# Patient Record
Sex: FEMALE | Race: WHITE | Employment: OTHER | URBAN - METROPOLITAN AREA
[De-identification: names, ages, dates, MRNs, and addresses within clinical notes are randomized per-mention and may not be internally consistent; named-entity substitution may affect disease eponyms.]

---

## 2019-03-08 LAB
ALBUMIN SERPL-MCNC: NORMAL G/DL
ALP BLD-CCNC: NORMAL U/L
ALT SERPL-CCNC: NORMAL U/L
ANION GAP SERPL CALCULATED.3IONS-SCNC: NORMAL MMOL/L
AST SERPL-CCNC: NORMAL U/L
AVERAGE GLUCOSE: NORMAL
BILIRUB SERPL-MCNC: NORMAL MG/DL
BUN BLDV-MCNC: NORMAL MG/DL
CALCIUM SERPL-MCNC: NORMAL MG/DL
CHLORIDE BLD-SCNC: NORMAL MMOL/L
CHOLESTEROL, TOTAL: NORMAL
CHOLESTEROL/HDL RATIO: NORMAL
CO2: NORMAL
CREAT SERPL-MCNC: NORMAL MG/DL
GFR CALCULATED: NORMAL
GLUCOSE BLD-MCNC: NORMAL MG/DL
HBA1C MFR BLD: 5.6 %
HDLC SERPL-MCNC: NORMAL MG/DL
LDL CHOLESTEROL CALCULATED: NORMAL
POTASSIUM SERPL-SCNC: NORMAL MMOL/L
SODIUM BLD-SCNC: NORMAL MMOL/L
TOTAL PROTEIN: NORMAL
TRIGL SERPL-MCNC: NORMAL MG/DL
VLDLC SERPL CALC-MCNC: NORMAL MG/DL

## 2019-12-23 RX ORDER — CHOLECALCIFEROL (VITAMIN D3) 1250 MCG
CAPSULE ORAL WEEKLY
COMMUNITY
End: 2022-06-28

## 2019-12-23 RX ORDER — ALBUTEROL SULFATE 90 UG/1
2 AEROSOL, METERED RESPIRATORY (INHALATION) 4 TIMES DAILY PRN
COMMUNITY
End: 2020-04-16

## 2019-12-23 RX ORDER — SIMVASTATIN 10 MG
10 TABLET ORAL DAILY
COMMUNITY
End: 2020-02-18 | Stop reason: SDUPTHER

## 2020-02-18 NOTE — TELEPHONE ENCOUNTER
Name of Medication(s) Requested:  simvastatin 10 mg qd    Pharmacy Requested:   [facility]    Medication(s) pended? [x] Yes  [] No    Last Appointment:  Visit date not found    Future appts:  Future Appointments   Date Time Provider Barb Kong   3/13/2020  2:00 PM DO [facility]        Does patient need call back?   [] Yes  [x] No

## 2020-02-19 RX ORDER — SIMVASTATIN 10 MG
10 TABLET ORAL DAILY
Qty: 90 TABLET | Refills: 3 | Status: SHIPPED
Start: 2020-02-19 | End: 2020-04-28 | Stop reason: SDUPTHER

## 2020-03-02 ENCOUNTER — TELEPHONE (OUTPATIENT)
Dept: ADMINISTRATIVE | Age: 65
End: 2020-03-02

## 2020-04-16 ENCOUNTER — HOSPITAL ENCOUNTER (OUTPATIENT)
Age: 65
Setting detail: OBSERVATION
Discharge: HOME | End: 2020-04-17
Attending: EMERGENCY MEDICINE | Admitting: INTERNAL MEDICINE
Payer: MEDICARE

## 2020-04-16 ENCOUNTER — APPOINTMENT (OUTPATIENT)
Dept: GENERAL RADIOLOGY | Age: 65
End: 2020-04-16
Payer: MEDICARE

## 2020-04-16 ENCOUNTER — APPOINTMENT (OUTPATIENT)
Dept: CT IMAGING | Age: 65
End: 2020-04-16
Payer: MEDICARE

## 2020-04-16 PROBLEM — R55 SYNCOPE AND COLLAPSE: Status: ACTIVE | Noted: 2020-04-16

## 2020-04-16 LAB
ALBUMIN SERPL-MCNC: 4.7 G/DL (ref 3.5–5.2)
ALP BLD-CCNC: 75 U/L (ref 35–104)
ALT SERPL-CCNC: 26 U/L (ref 0–32)
ANION GAP SERPL CALCULATED.3IONS-SCNC: 11 MMOL/L (ref 7–16)
AST SERPL-CCNC: 26 U/L (ref 0–31)
BASOPHILS ABSOLUTE: 0.02 E9/L (ref 0–0.2)
BASOPHILS RELATIVE PERCENT: 0.3 % (ref 0–2)
BILIRUB SERPL-MCNC: 0.3 MG/DL (ref 0–1.2)
BILIRUBIN URINE: NEGATIVE
BLOOD, URINE: NEGATIVE
BUN BLDV-MCNC: 16 MG/DL (ref 8–23)
CALCIUM SERPL-MCNC: 9.3 MG/DL (ref 8.6–10.2)
CHLORIDE BLD-SCNC: 101 MMOL/L (ref 98–107)
CLARITY: CLEAR
CO2: 26 MMOL/L (ref 22–29)
COLOR: NORMAL
CREAT SERPL-MCNC: 0.9 MG/DL (ref 0.5–1)
EOSINOPHILS ABSOLUTE: 0.03 E9/L (ref 0.05–0.5)
EOSINOPHILS RELATIVE PERCENT: 0.4 % (ref 0–6)
GFR AFRICAN AMERICAN: >60
GFR NON-AFRICAN AMERICAN: >60 ML/MIN/1.73
GLUCOSE BLD-MCNC: 111 MG/DL (ref 74–99)
GLUCOSE URINE: NEGATIVE MG/DL
HCT VFR BLD CALC: 40.1 % (ref 34–48)
HEMOGLOBIN: 13 G/DL (ref 11.5–15.5)
IMMATURE GRANULOCYTES #: 0.03 E9/L
IMMATURE GRANULOCYTES %: 0.4 % (ref 0–5)
KETONES, URINE: NEGATIVE MG/DL
LEUKOCYTE ESTERASE, URINE: NEGATIVE
LYMPHOCYTES ABSOLUTE: 1.25 E9/L (ref 1.5–4)
LYMPHOCYTES RELATIVE PERCENT: 16.6 % (ref 20–42)
MAGNESIUM: 2 MG/DL (ref 1.6–2.6)
MCH RBC QN AUTO: 28.8 PG (ref 26–35)
MCHC RBC AUTO-ENTMCNC: 32.4 % (ref 32–34.5)
MCV RBC AUTO: 88.9 FL (ref 80–99.9)
METER GLUCOSE: 104 MG/DL (ref 74–99)
MONOCYTES ABSOLUTE: 0.52 E9/L (ref 0.1–0.95)
MONOCYTES RELATIVE PERCENT: 6.9 % (ref 2–12)
NEUTROPHILS ABSOLUTE: 5.68 E9/L (ref 1.8–7.3)
NEUTROPHILS RELATIVE PERCENT: 75.4 % (ref 43–80)
NITRITE, URINE: NEGATIVE
PDW BLD-RTO: 12.9 FL (ref 11.5–15)
PH UA: 5.5 (ref 5–9)
PLATELET # BLD: 264 E9/L (ref 130–450)
PMV BLD AUTO: 10.3 FL (ref 7–12)
POTASSIUM SERPL-SCNC: 4.3 MMOL/L (ref 3.5–5)
PRO-BNP: 102 PG/ML (ref 0–125)
PROTEIN UA: NEGATIVE MG/DL
RBC # BLD: 4.51 E12/L (ref 3.5–5.5)
SODIUM BLD-SCNC: 138 MMOL/L (ref 132–146)
SPECIFIC GRAVITY UA: <=1.005 (ref 1–1.03)
TOTAL PROTEIN: 7.3 G/DL (ref 6.4–8.3)
TROPONIN: <0.01 NG/ML (ref 0–0.03)
UROBILINOGEN, URINE: 0.2 E.U./DL
WBC # BLD: 7.5 E9/L (ref 4.5–11.5)

## 2020-04-16 PROCEDURE — G0378 HOSPITAL OBSERVATION PER HR: HCPCS

## 2020-04-16 PROCEDURE — 99220 PR INITIAL OBSERVATION CARE/DAY 70 MINUTES: CPT | Performed by: INTERNAL MEDICINE

## 2020-04-16 PROCEDURE — 83880 ASSAY OF NATRIURETIC PEPTIDE: CPT

## 2020-04-16 PROCEDURE — 82962 GLUCOSE BLOOD TEST: CPT

## 2020-04-16 PROCEDURE — 99285 EMERGENCY DEPT VISIT HI MDM: CPT

## 2020-04-16 PROCEDURE — 83735 ASSAY OF MAGNESIUM: CPT

## 2020-04-16 PROCEDURE — 6360000002 HC RX W HCPCS: Performed by: INTERNAL MEDICINE

## 2020-04-16 PROCEDURE — 71045 X-RAY EXAM CHEST 1 VIEW: CPT

## 2020-04-16 PROCEDURE — 85025 COMPLETE CBC W/AUTO DIFF WBC: CPT

## 2020-04-16 PROCEDURE — 84484 ASSAY OF TROPONIN QUANT: CPT

## 2020-04-16 PROCEDURE — 96374 THER/PROPH/DIAG INJ IV PUSH: CPT

## 2020-04-16 PROCEDURE — 2580000003 HC RX 258: Performed by: INTERNAL MEDICINE

## 2020-04-16 PROCEDURE — 94760 N-INVAS EAR/PLS OXIMETRY 1: CPT

## 2020-04-16 PROCEDURE — 70450 CT HEAD/BRAIN W/O DYE: CPT

## 2020-04-16 PROCEDURE — 93005 ELECTROCARDIOGRAM TRACING: CPT | Performed by: EMERGENCY MEDICINE

## 2020-04-16 PROCEDURE — 81003 URINALYSIS AUTO W/O SCOPE: CPT

## 2020-04-16 PROCEDURE — 80053 COMPREHEN METABOLIC PANEL: CPT

## 2020-04-16 RX ORDER — ATORVASTATIN CALCIUM 10 MG/1
10 TABLET, FILM COATED ORAL DAILY
Status: DISCONTINUED | OUTPATIENT
Start: 2020-04-16 | End: 2020-04-16

## 2020-04-16 RX ORDER — SODIUM CHLORIDE 0.9 % (FLUSH) 0.9 %
10 SYRINGE (ML) INJECTION EVERY 12 HOURS SCHEDULED
Status: DISCONTINUED | OUTPATIENT
Start: 2020-04-16 | End: 2020-04-17 | Stop reason: HOSPADM

## 2020-04-16 RX ORDER — ONDANSETRON 2 MG/ML
4 INJECTION INTRAMUSCULAR; INTRAVENOUS EVERY 6 HOURS PRN
Status: DISCONTINUED | OUTPATIENT
Start: 2020-04-16 | End: 2020-04-17 | Stop reason: HOSPADM

## 2020-04-16 RX ORDER — ATORVASTATIN CALCIUM 10 MG/1
10 TABLET, FILM COATED ORAL NIGHTLY
Status: DISCONTINUED | OUTPATIENT
Start: 2020-04-16 | End: 2020-04-17 | Stop reason: HOSPADM

## 2020-04-16 RX ORDER — SODIUM CHLORIDE 0.9 % (FLUSH) 0.9 %
10 SYRINGE (ML) INJECTION PRN
Status: DISCONTINUED | OUTPATIENT
Start: 2020-04-16 | End: 2020-04-17 | Stop reason: HOSPADM

## 2020-04-16 RX ORDER — PROMETHAZINE HYDROCHLORIDE 25 MG/1
12.5 TABLET ORAL EVERY 6 HOURS PRN
Status: DISCONTINUED | OUTPATIENT
Start: 2020-04-16 | End: 2020-04-17 | Stop reason: HOSPADM

## 2020-04-16 RX ORDER — SODIUM CHLORIDE 9 MG/ML
INJECTION, SOLUTION INTRAVENOUS CONTINUOUS
Status: ACTIVE | OUTPATIENT
Start: 2020-04-16 | End: 2020-04-17

## 2020-04-16 RX ORDER — ACETAMINOPHEN 325 MG/1
650 TABLET ORAL EVERY 6 HOURS PRN
Status: DISCONTINUED | OUTPATIENT
Start: 2020-04-16 | End: 2020-04-17 | Stop reason: HOSPADM

## 2020-04-16 RX ORDER — ATORVASTATIN CALCIUM 10 MG/1
10 TABLET, FILM COATED ORAL DAILY
Status: DISCONTINUED | OUTPATIENT
Start: 2020-04-17 | End: 2020-04-16

## 2020-04-16 RX ORDER — POLYETHYLENE GLYCOL 3350 17 G/17G
17 POWDER, FOR SOLUTION ORAL DAILY PRN
Status: DISCONTINUED | OUTPATIENT
Start: 2020-04-16 | End: 2020-04-17 | Stop reason: HOSPADM

## 2020-04-16 RX ORDER — ACETAMINOPHEN 650 MG/1
650 SUPPOSITORY RECTAL EVERY 6 HOURS PRN
Status: DISCONTINUED | OUTPATIENT
Start: 2020-04-16 | End: 2020-04-17 | Stop reason: HOSPADM

## 2020-04-16 RX ORDER — HYDRALAZINE HYDROCHLORIDE 20 MG/ML
10 INJECTION INTRAMUSCULAR; INTRAVENOUS EVERY 6 HOURS PRN
Status: DISCONTINUED | OUTPATIENT
Start: 2020-04-16 | End: 2020-04-17 | Stop reason: HOSPADM

## 2020-04-16 RX ADMIN — SODIUM CHLORIDE: 9 INJECTION, SOLUTION INTRAVENOUS at 21:34

## 2020-04-16 RX ADMIN — SODIUM CHLORIDE, PRESERVATIVE FREE 10 ML: 5 INJECTION INTRAVENOUS at 21:35

## 2020-04-16 RX ADMIN — HYDRALAZINE HYDROCHLORIDE 10 MG: 20 INJECTION INTRAMUSCULAR; INTRAVENOUS at 21:34

## 2020-04-16 ASSESSMENT — ENCOUNTER SYMPTOMS
NAUSEA: 1
COUGH: 0
WHEEZING: 0
EYE REDNESS: 0
SORE THROAT: 0
SINUS PRESSURE: 0
RHINORRHEA: 0
EYE PAIN: 0
ABDOMINAL PAIN: 0
DIARRHEA: 0
BLURRED VISION: 0
SHORTNESS OF BREATH: 0
VOMITING: 0
COLOR CHANGE: 0

## 2020-04-16 ASSESSMENT — PAIN DESCRIPTION - DESCRIPTORS
DESCRIPTORS: HEADACHE
DESCRIPTORS: CONSTANT;THROBBING

## 2020-04-16 ASSESSMENT — PAIN DESCRIPTION - ORIENTATION: ORIENTATION: RIGHT;LEFT

## 2020-04-16 ASSESSMENT — PAIN DESCRIPTION - ONSET
ONSET: ON-GOING
ONSET: SUDDEN

## 2020-04-16 ASSESSMENT — PAIN DESCRIPTION - LOCATION
LOCATION: HEAD
LOCATION: HEAD

## 2020-04-16 ASSESSMENT — PAIN - FUNCTIONAL ASSESSMENT
PAIN_FUNCTIONAL_ASSESSMENT: ACTIVITIES ARE NOT PREVENTED
PAIN_FUNCTIONAL_ASSESSMENT: PREVENTS OR INTERFERES SOME ACTIVE ACTIVITIES AND ADLS

## 2020-04-16 ASSESSMENT — PAIN DESCRIPTION - PAIN TYPE
TYPE: ACUTE PAIN
TYPE: ACUTE PAIN

## 2020-04-16 ASSESSMENT — PAIN DESCRIPTION - PROGRESSION: CLINICAL_PROGRESSION: NOT CHANGED

## 2020-04-16 ASSESSMENT — PAIN DESCRIPTION - FREQUENCY
FREQUENCY: CONTINUOUS
FREQUENCY: CONTINUOUS

## 2020-04-16 ASSESSMENT — PAIN SCALES - GENERAL
PAINLEVEL_OUTOF10: 6
PAINLEVEL_OUTOF10: 7

## 2020-04-16 NOTE — ED PROVIDER NOTES
MCV 88.9 80.0 - 99.9 fL    MCH 28.8 26.0 - 35.0 pg    MCHC 32.4 32.0 - 34.5 %    RDW 12.9 11.5 - 15.0 fL    Platelets 261 220 - 925 E9/L    MPV 10.3 7.0 - 12.0 fL    Neutrophils % 75.4 43.0 - 80.0 %    Immature Granulocytes % 0.4 0.0 - 5.0 %    Lymphocytes % 16.6 (L) 20.0 - 42.0 %    Monocytes % 6.9 2.0 - 12.0 %    Eosinophils % 0.4 0.0 - 6.0 %    Basophils % 0.3 0.0 - 2.0 %    Neutrophils Absolute 5.68 1.80 - 7.30 E9/L    Immature Granulocytes # 0.03 E9/L    Lymphocytes Absolute 1.25 (L) 1.50 - 4.00 E9/L    Monocytes Absolute 0.52 0.10 - 0.95 E9/L    Eosinophils Absolute 0.03 (L) 0.05 - 0.50 E9/L    Basophils Absolute 0.02 0.00 - 0.20 E9/L   Comprehensive Metabolic Panel   Result Value Ref Range    Sodium 138 132 - 146 mmol/L    Potassium 4.3 3.5 - 5.0 mmol/L    Chloride 101 98 - 107 mmol/L    CO2 26 22 - 29 mmol/L    Anion Gap 11 7 - 16 mmol/L    Glucose 111 (H) 74 - 99 mg/dL    BUN 16 8 - 23 mg/dL    CREATININE 0.9 0.5 - 1.0 mg/dL    GFR Non-African American >60 >=60 mL/min/1.73    GFR African American >60     Calcium 9.3 8.6 - 10.2 mg/dL    Total Protein 7.3 6.4 - 8.3 g/dL    Alb 4.7 3.5 - 5.2 g/dL    Total Bilirubin 0.3 0.0 - 1.2 mg/dL    Alkaline Phosphatase 75 35 - 104 U/L    ALT 26 0 - 32 U/L    AST 26 0 - 31 U/L   Magnesium   Result Value Ref Range    Magnesium 2.0 1.6 - 2.6 mg/dL   Troponin   Result Value Ref Range    Troponin <0.01 0.00 - 0.03 ng/mL   Brain Natriuretic Peptide   Result Value Ref Range    Pro- 0 - 125 pg/mL   Urinalysis, reflex to microscopic   Result Value Ref Range    Color, UA Straw Straw/Yellow    Clarity, UA Clear Clear    Glucose, Ur Negative Negative mg/dL    Bilirubin Urine Negative Negative    Ketones, Urine Negative Negative mg/dL    Specific Gravity, UA <=1.005 1.005 - 1.030    Blood, Urine Negative Negative    pH, UA 5.5 5.0 - 9.0    Protein, UA Negative Negative mg/dL    Urobilinogen, Urine 0.2 <2.0 E.U./dL    Nitrite, Urine Negative Negative    Leukocyte Esterase,

## 2020-04-17 ENCOUNTER — APPOINTMENT (OUTPATIENT)
Dept: ULTRASOUND IMAGING | Age: 65
End: 2020-04-17
Payer: MEDICARE

## 2020-04-17 VITALS
TEMPERATURE: 97.7 F | HEIGHT: 63 IN | BODY MASS INDEX: 35.1 KG/M2 | RESPIRATION RATE: 18 BRPM | SYSTOLIC BLOOD PRESSURE: 140 MMHG | DIASTOLIC BLOOD PRESSURE: 76 MMHG | OXYGEN SATURATION: 97 % | WEIGHT: 198.1 LBS | HEART RATE: 69 BPM

## 2020-04-17 PROBLEM — E78.49 OTHER HYPERLIPIDEMIA: Status: ACTIVE | Noted: 2020-04-17

## 2020-04-17 PROBLEM — I16.0 HYPERTENSIVE URGENCY: Status: RESOLVED | Noted: 2020-04-17 | Resolved: 2020-04-17

## 2020-04-17 PROBLEM — R55 SYNCOPE: Status: RESOLVED | Noted: 2020-04-16 | Resolved: 2020-04-17

## 2020-04-17 PROBLEM — I16.0 HYPERTENSIVE URGENCY: Status: ACTIVE | Noted: 2020-04-17

## 2020-04-17 LAB
EKG ATRIAL RATE: 116 BPM
EKG P AXIS: 52 DEGREES
EKG P-R INTERVAL: 156 MS
EKG Q-T INTERVAL: 334 MS
EKG QRS DURATION: 80 MS
EKG QTC CALCULATION (BAZETT): 464 MS
EKG R AXIS: 3 DEGREES
EKG T AXIS: 33 DEGREES
EKG VENTRICULAR RATE: 116 BPM
LV EF: 63 %
LVEF MODALITY: NORMAL
TROPONIN: <0.01 NG/ML (ref 0–0.03)

## 2020-04-17 PROCEDURE — 36415 COLL VENOUS BLD VENIPUNCTURE: CPT

## 2020-04-17 PROCEDURE — 93880 EXTRACRANIAL BILAT STUDY: CPT

## 2020-04-17 PROCEDURE — 6370000000 HC RX 637 (ALT 250 FOR IP): Performed by: STUDENT IN AN ORGANIZED HEALTH CARE EDUCATION/TRAINING PROGRAM

## 2020-04-17 PROCEDURE — 84484 ASSAY OF TROPONIN QUANT: CPT

## 2020-04-17 PROCEDURE — 2580000003 HC RX 258: Performed by: INTERNAL MEDICINE

## 2020-04-17 PROCEDURE — 96376 TX/PRO/DX INJ SAME DRUG ADON: CPT

## 2020-04-17 PROCEDURE — 6360000002 HC RX W HCPCS: Performed by: INTERNAL MEDICINE

## 2020-04-17 PROCEDURE — 93306 TTE W/DOPPLER COMPLETE: CPT

## 2020-04-17 PROCEDURE — G0378 HOSPITAL OBSERVATION PER HR: HCPCS

## 2020-04-17 PROCEDURE — 6370000000 HC RX 637 (ALT 250 FOR IP): Performed by: INTERNAL MEDICINE

## 2020-04-17 PROCEDURE — 93010 ELECTROCARDIOGRAM REPORT: CPT | Performed by: INTERNAL MEDICINE

## 2020-04-17 PROCEDURE — 99217 PR OBSERVATION CARE DISCHARGE MANAGEMENT: CPT | Performed by: FAMILY MEDICINE

## 2020-04-17 RX ORDER — HYDRALAZINE HYDROCHLORIDE 20 MG/ML
10 INJECTION INTRAMUSCULAR; INTRAVENOUS ONCE
Status: COMPLETED | OUTPATIENT
Start: 2020-04-17 | End: 2020-04-17

## 2020-04-17 RX ORDER — TRAMADOL HYDROCHLORIDE 50 MG/1
50 TABLET ORAL ONCE
Status: COMPLETED | OUTPATIENT
Start: 2020-04-17 | End: 2020-04-17

## 2020-04-17 RX ORDER — LISINOPRIL 20 MG/1
20 TABLET ORAL DAILY
Qty: 30 TABLET | Refills: 3 | Status: SHIPPED | OUTPATIENT
Start: 2020-04-18

## 2020-04-17 RX ORDER — TRAMADOL HYDROCHLORIDE 50 MG/1
TABLET ORAL
Status: DISPENSED
Start: 2020-04-17 | End: 2020-04-17

## 2020-04-17 RX ORDER — LISINOPRIL 20 MG/1
20 TABLET ORAL DAILY
Status: DISCONTINUED | OUTPATIENT
Start: 2020-04-17 | End: 2020-04-17 | Stop reason: HOSPADM

## 2020-04-17 RX ADMIN — SODIUM CHLORIDE, PRESERVATIVE FREE 10 ML: 5 INJECTION INTRAVENOUS at 07:59

## 2020-04-17 RX ADMIN — TRAMADOL HYDROCHLORIDE 50 MG: 50 TABLET, FILM COATED ORAL at 00:32

## 2020-04-17 RX ADMIN — ACETAMINOPHEN 650 MG: 325 TABLET ORAL at 06:50

## 2020-04-17 RX ADMIN — HYDRALAZINE HYDROCHLORIDE 10 MG: 20 INJECTION INTRAMUSCULAR; INTRAVENOUS at 00:32

## 2020-04-17 RX ADMIN — TRAMADOL HYDROCHLORIDE 50 MG: 50 TABLET, FILM COATED ORAL at 10:53

## 2020-04-17 RX ADMIN — LISINOPRIL 20 MG: 20 TABLET ORAL at 08:46

## 2020-04-17 ASSESSMENT — PAIN DESCRIPTION - ORIENTATION
ORIENTATION: RIGHT;LEFT
ORIENTATION: RIGHT;LEFT

## 2020-04-17 ASSESSMENT — PAIN DESCRIPTION - FREQUENCY
FREQUENCY: CONTINUOUS
FREQUENCY: CONTINUOUS

## 2020-04-17 ASSESSMENT — PAIN SCALES - GENERAL
PAINLEVEL_OUTOF10: 6
PAINLEVEL_OUTOF10: 5
PAINLEVEL_OUTOF10: 8
PAINLEVEL_OUTOF10: 3
PAINLEVEL_OUTOF10: 4
PAINLEVEL_OUTOF10: 5

## 2020-04-17 ASSESSMENT — PAIN DESCRIPTION - PROGRESSION
CLINICAL_PROGRESSION: NOT CHANGED
CLINICAL_PROGRESSION: NOT CHANGED

## 2020-04-17 ASSESSMENT — PAIN DESCRIPTION - LOCATION
LOCATION: HEAD
LOCATION: HEAD

## 2020-04-17 ASSESSMENT — PAIN DESCRIPTION - PAIN TYPE
TYPE: ACUTE PAIN
TYPE: ACUTE PAIN

## 2020-04-17 ASSESSMENT — PAIN DESCRIPTION - DESCRIPTORS
DESCRIPTORS: HEADACHE
DESCRIPTORS: HEADACHE

## 2020-04-17 ASSESSMENT — PAIN DESCRIPTION - ONSET
ONSET: ON-GOING
ONSET: ON-GOING

## 2020-04-17 NOTE — DISCHARGE SUMMARY
[address]  Discharge Summary      Patient ID:  Andrzej Acevedo  [id number]  58 y.o.  1955    Admit date: 4/16/2020    Discharge date and time: 4/17/2020    Location of discharge: [facility]     Admitting Physician: Deandre Briceno MD     Discharge Physician: Herbert Burrell MD    Consults: none    Admission Diagnoses: Syncope and collapse [R55]  Syncope and collapse [R55]    Discharge Diagnoses: Active Problems:    Other hyperlipidemia  Resolved Problems:    Syncope    Hypertensive urgency      Hospital Course: 42-year-old female who was admitted for syncope and collapse. Patient was also found to have hypertensive urgency. Patient's blood pressure improved with 20 mg of lisinopril. Patient got an echocardiogram and a carotid ultrasound, both of which were fairly unremarkable. Patient was thus discharged home on lisinopril to follow-up with her primary care physician. Post Discharge Follow Up Issues: Patient may need Holter monitor and possibly cardiology referral to further evaluate the syncope as this could be heart related and may need a repeat BMP to ensure that the lisinopril is not causing an increase in creatinine. Discharged Condition: stable    Significant Diagnostic Studies:   US CAROTID ARTERY BILATERAL   Final Result      No evidence for hemodynamically significant stenosis of the carotid   arteries based on NASCET criteria (0-49%)      No evidence for subclavian steal.      CT Head WO Contrast   Final Result   No evidence of intracranial hemorrhage or edema. There is   age-appropriate atrophy.                 XR CHEST PORTABLE   Final Result      NO ACUTE CARDIOPULMONARY PROCESS              Treatments: Tramadol, lisinopril, and hydralazine    Disposition: home    Patient Instructions:      Medication List      START taking these medications    lisinopril 20 MG tablet  Commonly known as:  PRINIVIL;ZESTRIL  Take 1 tablet by mouth

## 2020-04-17 NOTE — H&P
[facility] History and Physical      CHIEF COMPLAINT:  syncope    History of Present Illness: 47-year-old female who was a kidney donor and has a stop after waking up she was lying in bed talking to her  when she suddenly stopped responding. When he looked at her her eyes rolled back and she was not answering. He managed her gently which she remembers and then woke up. Reportedly no postictal confusion. She did have some nausea before passing out that continued up until noon. No vomiting, abdominal pain, palpitations, chest pain, orthostatic symptoms or focal neurologic deficits. She does have a frontal pressure-like headaches similar to her usual tension headache that is persistent until now but otherwise has no further symptoms. Did not eat anything out of the ordinary yesterday. She checked her blood pressure and noted her systolic blood pressure was in the 200s. She therefore presented to the ED for further evaluation. She reports her baseline blood pressure is 90s to 100/60. Also has syncope when she gets hot but has not had it with nausea in the past.  Orthostatic vitals were obtained in the ED and were negative. Throughout her ED stay her blood pressure remained 234V to 833 systolic    Informant(s) for H&P:patient    REVIEW OF SYSTEMS:  A comprehensive 14 point review of systems was negative except for: what is in the HPI    PMH:  Past Medical History:   Diagnosis Date    Contusion of back     Displacement of lumbar intervertebral disc without myelopathy     Fracture 05/2011    Pelvis    Hyperlipidemia     IBS (irritable bowel syndrome)     L4-L5 disc bulge     Vitamin D deficiency        Surgical History:  Past Surgical History:   Procedure Laterality Date    CHOLECYSTECTOMY      KIDNEY SURGERY      Kidney donation to 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 Medications Prior to Admission:    Prior to Admission medications    Medication Sig Start Date End Date Taking?  Authorizing hours. Radiology:   CT Head WO Contrast   Final Result   No evidence of intracranial hemorrhage or edema. There is   age-appropriate atrophy. XR CHEST PORTABLE   Final Result      NO ACUTE CARDIOPULMONARY PROCESS             EKG: Per my read shows no acute normality    ASSESSMENT:    Syncope:? Vasovagal  Accelerated hypertension  Tension headache  Hyperlipidemia    PLAN:    1. Syncope: Likely vasovagal from nausea. - Monitor on telemetry overnight  -IV fluids  - Due to current pandemic and lack of other associated symptoms will defer further evaluation to outpatient basis unless there is change overnight    2. Accelerated hypertension: Reports baseline has marginal blood pressure. -PRN hydralazine tonight  -If persistently elevated may need oral medications    3. Tension headache: CT head negative. Tylenol    4. Hyperlipidemia: Continue Zocor. Code Status: full  DVT prophylaxis: lovenox      NOTE: This report was transcribed using voice recognition software. Every effort was made to ensure accuracy; however, inadvertent computerized transcription errors may be present.   Electronically signed by Bj Walker MD on 4/16/2020 at 8:36 PM

## 2020-04-17 NOTE — PROGRESS NOTES
mg/dL    Specific Gravity, UA <=1.005 1.005 - 1.030    Blood, Urine Negative Negative    pH, UA 5.5 5.0 - 9.0    Protein, UA Negative Negative mg/dL    Urobilinogen, Urine 0.2 <2.0 E.U./dL    Nitrite, Urine Negative Negative    Leukocyte Esterase, Urine Negative Negative   POCT Glucose    Collection Time: 04/16/20  5:29 PM   Result Value Ref Range    Meter Glucose 104 (H) 74 - 99 mg/dL       Radiology and other tests reviewed:  CT Head WO Contrast   Final Result   No evidence of intracranial hemorrhage or edema. There is   age-appropriate atrophy. XR CHEST PORTABLE   Final Result      NO ACUTE CARDIOPULMONARY PROCESS             Assessment:  Active Hospital Problems    Diagnosis Date Noted    Hypertensive urgency [I16.0] 04/17/2020    Other hyperlipidemia [E78.49] 04/17/2020    Syncope and collapse [R55] 04/16/2020       Plan:  1. Syncope: story is concerning for vasovagal  -Telemetry overnight - no events   -IV fluids  -Consider full work up with echo and carotid US  - Due to current pandemic and lack of other associated symptoms will defer further evaluation to outpatient basis unless there is change overnight     2. Accelerated hypertension:   -Continues to be elevated, will start lisinopril  -PRN hydralazine tonight     3. Tension headache: CT head negative. Improved with tramadol, could be due to elevated blood pressure     4.   Hyperlipidemia: Continue Zocor.     Code Status: full  DVT prophylaxis: ketan Harrington DO  Family Medicine Resident PGY-2  04/17/20   8:11 AM

## 2020-04-28 RX ORDER — SIMVASTATIN 10 MG
10 TABLET ORAL DAILY
Qty: 90 TABLET | Refills: 3 | Status: SHIPPED
Start: 2020-04-28 | End: 2021-03-17 | Stop reason: SDUPTHER

## 2020-05-12 ENCOUNTER — HOSPITAL ENCOUNTER (OUTPATIENT)
Dept: ULTRASOUND IMAGING | Age: 65
Discharge: HOME | End: 2020-05-14
Payer: MEDICARE

## 2020-05-12 PROCEDURE — 93975 VASCULAR STUDY: CPT

## 2020-05-12 PROCEDURE — 76770 US EXAM ABDO BACK WALL COMP: CPT

## 2020-11-04 ENCOUNTER — TELEPHONE (OUTPATIENT)
Dept: PRIMARY CARE CLINIC | Age: 65
End: 2020-11-04

## 2020-11-04 NOTE — TELEPHONE ENCOUNTER
Pt called in requesting jury duty excuse for [address], who is not a pt of Dr. [place] pt that Dr. iLnda Noe is unable to excuse her  for Edith Mahesh Duty because he is not a pt of his. Instructed pt to contact her 's PCP for excuse.

## 2021-01-17 NOTE — PROGRESS NOTES
[facility] and [address] [facility] Electrophysiology  Consultation Report  PATIENT: [facility] RECORD NUMBER: [id number]  DATE OF SERVICE:  1/20/2021  ATTENDING ELECTROPHYSIOLOGIST: Angela Cali MD  REFERRING PHYSICIAN: Williams Tang MD and Davida Nevarez MD  CHIEF COMPLAINT: Syncope    HPI: This is a 77 y.o. female with a history of   Patient Active Problem List   Diagnosis    Other hyperlipidemia   who presents to cardiac electrophysiology clinic for consultation of syncope. She reports a longstanding history of syncope, since her teenage years. She reports a noted trigger of being to hot. The patient presented to the hospital on 4/16/20 after the syncopal episode and was found to have hypertensive urgency. She reports that during this particular episode, she was laying down in bed and her BP was elevated. The next thing she remembers is being awaken by EMS. An echocardiogram at that time showed normal LV EF. Mild LVH and mild MR. After the episode in April 2020, she was started on Lisinopril and Norvasc for BP control. She goes on to report, that she had another syncopal episode on January 3, 2021. She states she was sitting in the chair, her BP was 111/82 , she was found unresponsive by her . Her last episode occurred on January 11, 2021, she again was sitting, her BP was noted to be 70/52. She does report prodromal symptoms of funny sensation in her head. She denies any loss of bowel or bladder function, denies tongue biting or jerking sensations. When she awakens, she reports being extremely fatigued. She is unsure of how long she was unconscious for. She denies any chest pain with ambulation. She presents today in SR. The patient denies any chest pain, dyspnea, palpitations, dizziness, orthopnea or paroxysmal nocturnal dyspnea. She denies a family history of SCD.       Patient Active Problem List    Diagnosis Date Noted    Other hyperlipidemia 04/17/2020       Past Medical History:   Diagnosis Date    Contusion of back     Displacement of lumbar intervertebral disc without myelopathy     Fracture 05/2011    Pelvis    Hyperlipidemia     IBS (irritable bowel syndrome)     L4-L5 disc bulge     Vitamin D deficiency        Family History   Problem Relation Age of Onset    Heart Disease Mother        Social History     Tobacco Use    Smoking status: Never Smoker    Smokeless tobacco: Never Used   Substance Use Topics    Alcohol use: No       Current Outpatient Medications   Medication Sig Dispense Refill    amLODIPine (NORVASC) 2.5 MG tablet 2.5 mg 2 times daily       Glucosamine 500 MG CAPS Take 500 mg by mouth daily      Pumpkin Seed-Soy Germ (AZO BLADDER CONTROL/GO-LESS PO) Take by mouth      simvastatin (ZOCOR) 10 MG tablet Take 1 tablet by mouth daily one daily for cholesterol--no food 90 tablet 3    lisinopril (PRINIVIL;ZESTRIL) 20 MG tablet Take 1 tablet by mouth daily 30 tablet 3    Cholecalciferol (VITAMIN D3) 1.25 MG (21168 UT) CAPS Take by mouth once a week      Calcium Carbonate-Vitamin D (CALCIUM + D PO) Take 1 tablet by mouth daily.  VITAMIN E PO Take 1 capsule by mouth daily. Dose unknown       No current facility-administered medications for this visit. No Known Allergies    ROS:   Constitutional: Negative for fever, activity change and appetite change. HENT: Negative for epistaxis. Eyes: Negative for diploplia, blurred vision. Respiratory: Negative for cough, chest tightness, shortness of breath and wheezing. Cardiovascular: pertinent positives in HPI  Gastrointestinal: Negative for abdominal pain and blood in stool.    All other review of systems are negative     PHYSICAL EXAM:  Vitals:    01/20/21 1253   BP: 122/84   Pulse: 74   Resp: 14   Temp: 97.8 °F (36.6 °C)   TempSrc: Temporal   Weight: 200 lb (90.7 kg)   Height: 5' 3\" (1.6 m)      Constitutional: Well-developed, no acute distress Eyes: conjunctivae normal, no xanthelasma   Ears, Nose, Throat: oral mucosa moist, no cyanosis   CV: no JVD. Regular rate and rhythm. Normal S1S2 and no S3. No murmurs, rubs, or gallops. PMI is nondisplaced  Lungs: clear to auscultation bilaterally, normal respiratory effort without used of accessory muscles  Abdomen: soft, non-tender, bowel sounds present, no masses or hepatomegaly   Musculoskeletal: no digital clubbing, no edema   Skin: warm, no rashes     I have personally reviewed the laboratory, cardiac diagnostic and radiographic testing as outlined below:    Data:    No results for input(s): WBC, HGB, HCT, PLT in the last 72 hours. No results for input(s): NA, K, CL, CO2, BUN, CREATININE, CALCIUM in the last 72 hours. Invalid input(s): GLU, MAGNESIUM   Lab Results   Component Value Date    MG 2.0 04/16/2020     No results for input(s): TSH in the last 72 hours. No results for input(s): INR in the last 72 hours. CXR 4/16/20:   Findings:       The lungs are clear.  There is no evidence of pulmonary infiltrate or   pleural effusion.  The pulmonary vascularity is unremarkable.  The   cardiac, hilar and mediastinal silhouettes are satisfactory.  The bony   thorax demonstrates no gross abnormality.               Impression       NO ACUTE CARDIOPULMONARY PROCESS       EKG 1/20/21: SR, rate 74bpm: low voltage QRS, PRWP, QTc 402 ms- Please see scan in Cardiology. Echocardiogram 4/17/20:   Findings      Left Ventricle   Left ventricle is normal in size . Mild left ventricular concentric hypertrophy noted. No regional wall motion abnormalities seen. Normal left ventricular ejection fraction. Ejection fraction is visually estimated at 60-65%. Normal left ventricular diastolic filling pattern. Right Ventricle   Normal right ventricular size and function. Left Atrium   Normal sized left atrium. Interatrial septum appears intact. Right Atrium   Normal right atrium size.       Mitral Valve   Structurally normal mitral valve. Mild mitral regurgitation is present. Tricuspid Valve   The tricuspid valve appears structurally normal.      Aortic Valve   Structurally normal aortic valve. Pulmonic Valve   The pulmonic valve was not well visualized. Pericardial Effusion   No evidence of pericardial effusion. Aorta   Aortic root dimension within normal limits. Conclusions      Summary   Left ventricle is normal in size . Mild left ventricular concentric hypertrophy noted. No regional wall motion abnormalities seen. Normal left ventricular ejection fraction. Mild mitral regurgitation is present.       Signature      ----------------------------------------------------------------   Electronically signed by Leilani Sams MD(Interpreting   physician) on 04/17/2020 11:52 AM   ----------------------------------------------------------------     M-Mode/2D Measurements & Calculations      LV Diastolic    LV Systolic Dimension: 2.4   AV Cusp Separation: 1.7 cmLA   Dimension: 3.4  cm                           Dimension: 2.9 cmAO Root   cm              LV Volume Diastolic: 77.9 ml Dimension: 2.3 cm   LV FS:29.4 %    LV Volume Systolic: 77.7 ml   LV PW           LV EDV/LV EDV Index: 87.8   Diastolic: 1.1  UE/80 ME/S^7UK ESV/LV ESV   cm              Index: 20.3 ml/11ml/ m^2     RV Diastolic Dimension: 2.6   LV PW Systolic: EF Calculated: 82.8 %        cm   1.6 cm          LV Mass Index: 63 l/min*m^2   Septum                                       LA/Aorta: 3.70   Diastolic: 1.2                               Ascending Aorta: 2.7 cm   cm              LVOT: 1.9 cm                 LA volume/Index: 27.6 ml   Septum                                       /16SK/U^9   Systolic: 1.3                                RA Area: 9.3 cm^2   cm   CO: 7.27 l/min                               IVC Expiration: 1.9 cm   CI: 3.77   l/m*m^2   LV Mass: 121.98   g     Doppler Measurements & Calculations      MV Peak E-Wave: 1.01 AV Peak Velocity:     LVOT Peak Velocity: 1.42 m/s   m/s                  1.88 m/s              LVOT Mean Velocity: 1.03 m/s   MV Peak A-Wave: 1.22 AV Peak Gradient:     LVOT Peak Gradient: 8.1   m/s                  14.14 mmHg            mmHgLVOT Mean Gradient: 4.6   MV E/A Ratio: 0.83   AV Mean Velocity: 1.3 mmHg   MV Peak Gradient:    m/s                   Estimated RVSP: 34.2 mmHg   7.4 mmHg             AV Mean Gradient: 7.6 Estimated RAP:3 mmHg   MV Mean Gradient:    mmHg   3.4 mmHg             AV VTI: 41.5 cm   MV Mean Velocity:    AV Area               TR Velocity:2.79 m/s   0.85 m/s             (Continuity):2.11     TR Gradient:31.18 mmHg   MV Deceleration      cm^2                  PV Peak Velocity: 1.31 m/s   Time: 191.6 msec                           PV Peak Gradient: 6.82 mmHg   MV P1/2t: 44.4 msec  LVOT VTI: 30.9 cm     PV Mean Velocity: 0.89 m/s   MVA by PHT:4.95 cm^2 IVRT: 48.4 msec       PV Mean Gradient: 3.6 mmHg   MV Area              Estimated PASP: 34.18   (continuity): 3 cm^2 mmHg   MV E' Septal   Velocity: 0.1 m/s   MV E' Lateral   Velocity: 12 m/s    I have independently reviewed all of the ECGs and rhythm strips per above     Assessment/Plan: This is a 77 y.o. female with a history of   Patient Active Problem List   Diagnosis    Other hyperlipidemia    who presents with syncope. 1. Syncope  - First episode could be from hypertensive urgency and last 2 episodes could be from vasovagal in origin. - There are no clear red flag symptoms consistent with cardiac syncope. - EKG has not shown any other clear arrhythmogenic cause (WPW, HOCM, LQT, Brugada, ARVC)  - Other cardiac workup thus far has shown normal echocardiogram,  - Advised life style modifications as below     - Make all postural changes from lying to sitting or sitting to standing slowly. - Drink to 2.0 -2.5 L of fluids per day. - Increase sodium in the diet to 3 - 5 g per day.      - Avoid large meals

## 2021-01-20 ENCOUNTER — OFFICE VISIT (OUTPATIENT)
Dept: NON INVASIVE DIAGNOSTICS | Age: 66
End: 2021-01-20
Payer: MEDICARE

## 2021-01-20 VITALS
DIASTOLIC BLOOD PRESSURE: 84 MMHG | HEIGHT: 63 IN | BODY MASS INDEX: 35.44 KG/M2 | RESPIRATION RATE: 14 BRPM | SYSTOLIC BLOOD PRESSURE: 122 MMHG | HEART RATE: 74 BPM | WEIGHT: 200 LBS | TEMPERATURE: 97.8 F

## 2021-01-20 DIAGNOSIS — R55 SYNCOPE, UNSPECIFIED SYNCOPE TYPE: Primary | ICD-10-CM

## 2021-01-20 PROCEDURE — 93000 ELECTROCARDIOGRAM COMPLETE: CPT | Performed by: INTERNAL MEDICINE

## 2021-01-20 PROCEDURE — 1090F PRES/ABSN URINE INCON ASSESS: CPT | Performed by: INTERNAL MEDICINE

## 2021-01-20 PROCEDURE — 3017F COLORECTAL CA SCREEN DOC REV: CPT | Performed by: INTERNAL MEDICINE

## 2021-01-20 PROCEDURE — 4040F PNEUMOC VAC/ADMIN/RCVD: CPT | Performed by: INTERNAL MEDICINE

## 2021-01-20 PROCEDURE — G8400 PT W/DXA NO RESULTS DOC: HCPCS | Performed by: INTERNAL MEDICINE

## 2021-01-20 PROCEDURE — 1123F ACP DISCUSS/DSCN MKR DOCD: CPT | Performed by: INTERNAL MEDICINE

## 2021-01-20 PROCEDURE — 99205 OFFICE O/P NEW HI 60 MIN: CPT | Performed by: INTERNAL MEDICINE

## 2021-01-20 PROCEDURE — G8484 FLU IMMUNIZE NO ADMIN: HCPCS | Performed by: INTERNAL MEDICINE

## 2021-01-20 PROCEDURE — 1036F TOBACCO NON-USER: CPT | Performed by: INTERNAL MEDICINE

## 2021-01-20 PROCEDURE — G8417 CALC BMI ABV UP PARAM F/U: HCPCS | Performed by: INTERNAL MEDICINE

## 2021-01-20 PROCEDURE — G8427 DOCREV CUR MEDS BY ELIG CLIN: HCPCS | Performed by: INTERNAL MEDICINE

## 2021-01-20 RX ORDER — GLUCOSAMINE SULFATE 500 MG
500 CAPSULE ORAL DAILY
COMMUNITY

## 2021-01-20 RX ORDER — AMLODIPINE BESYLATE 2.5 MG/1
2.5 TABLET ORAL 2 TIMES DAILY
COMMUNITY
Start: 2021-01-14

## 2021-01-20 NOTE — PATIENT INSTRUCTIONS
- Advised life style modifications as below     - Make all postural changes from lying to sitting or sitting to standing slowly. - Drink to 2.0 -2.5 L of fluids per day. With bad symptoms, drink 500 cc of water quickly. This will result in an increased blood pressure within 5 minutes of drinking the water. The effect will last up to one hour and may improve orthostatic intolerance. - Increase sodium in the diet to 3 - 5 g per day. If not helpful and BP is stable, may try 5-7 g per day. - Avoid large meals which can cause low blood pressure during digestion. It is better to eat smaller meals more often than three large meals. - Avoid alcohol. Alcohol and cause blood to pool in the legs which may worsen low blood pressure reactions when standing. Avoid excessive caffeine intake as it may increase urine production and reduce blood volume. - Perform lower extremity exercises to improve strength of the leg muscles. This will help prevent blood from a pooling in the legs when standing and walking. Preferred exercises are walking, squatting or stationery bicycling.      -Use physical counter maneuvers such as leg crossing, or leg raising and resting the leg on a chair. These maneuvers increase blood pressure and can improve orthostatic intolerance quickly and transiently. - Raise the head of the bed by 6 to 10 inches. The entire bed must be at an angle. Raising only the head portion of the bed at waist level or using pillows will not be effective. Raising the head of the bed will reduce urine formation overnight and there will be more volume in the circulation in the morning.      - Use custom fitted elastic support stockings.  These will reduce a tendency for blood to pool in the legs when standing and may improve orthostatic intolerance

## 2021-01-21 DIAGNOSIS — G47.30 SLEEP DISORDER BREATHING: Primary | ICD-10-CM

## 2021-01-22 DIAGNOSIS — R55 SYNCOPE, UNSPECIFIED SYNCOPE TYPE: ICD-10-CM

## 2021-01-25 ENCOUNTER — APPOINTMENT (OUTPATIENT)
Dept: GENERAL RADIOLOGY | Age: 66
End: 2021-01-25
Attending: INTERNAL MEDICINE
Payer: MEDICARE

## 2021-01-25 ENCOUNTER — ANESTHESIA (OUTPATIENT)
Dept: CARDIAC CATH/INVASIVE PROCEDURES | Age: 66
End: 2021-01-25
Payer: MEDICARE

## 2021-01-25 ENCOUNTER — APPOINTMENT (OUTPATIENT)
Dept: CARDIAC CATH/INVASIVE PROCEDURES | Age: 66
End: 2021-01-25
Attending: INTERNAL MEDICINE
Payer: MEDICARE

## 2021-01-25 ENCOUNTER — ANESTHESIA EVENT (OUTPATIENT)
Dept: CARDIAC CATH/INVASIVE PROCEDURES | Age: 66
End: 2021-01-25
Payer: MEDICARE

## 2021-01-25 ENCOUNTER — HOSPITAL ENCOUNTER (OUTPATIENT)
Age: 66
Setting detail: OBSERVATION
Discharge: HOME | End: 2021-01-27
Attending: INTERNAL MEDICINE | Admitting: STUDENT IN AN ORGANIZED HEALTH CARE EDUCATION/TRAINING PROGRAM
Payer: MEDICARE

## 2021-01-25 VITALS — SYSTOLIC BLOOD PRESSURE: 137 MMHG | DIASTOLIC BLOOD PRESSURE: 84 MMHG | OXYGEN SATURATION: 92 %

## 2021-01-25 PROBLEM — I44.2 COMPLETE AV BLOCK (HCC): Status: ACTIVE | Noted: 2021-01-25

## 2021-01-25 LAB
ALBUMIN SERPL-MCNC: 4.9 G/DL (ref 3.5–5.2)
ALP BLD-CCNC: 70 U/L (ref 35–104)
ALT SERPL-CCNC: 30 U/L (ref 0–32)
ANION GAP SERPL CALCULATED.3IONS-SCNC: 9 MMOL/L (ref 7–16)
AST SERPL-CCNC: 26 U/L (ref 0–31)
BILIRUB SERPL-MCNC: 0.5 MG/DL (ref 0–1.2)
BUN BLDV-MCNC: 20 MG/DL (ref 8–23)
CALCIUM SERPL-MCNC: 9.5 MG/DL (ref 8.6–10.2)
CHLORIDE BLD-SCNC: 102 MMOL/L (ref 98–107)
CO2: 29 MMOL/L (ref 22–29)
CREAT SERPL-MCNC: 1.1 MG/DL (ref 0.5–1)
GFR AFRICAN AMERICAN: 60
GFR NON-AFRICAN AMERICAN: 49 ML/MIN/1.73
GLUCOSE BLD-MCNC: 97 MG/DL (ref 74–99)
HCT VFR BLD CALC: 42.4 % (ref 34–48)
HEMOGLOBIN: 13.1 G/DL (ref 11.5–15.5)
INR BLD: 0.9
MAGNESIUM: 2 MG/DL (ref 1.6–2.6)
MCH RBC QN AUTO: 27.8 PG (ref 26–35)
MCHC RBC AUTO-ENTMCNC: 30.9 % (ref 32–34.5)
MCV RBC AUTO: 89.8 FL (ref 80–99.9)
PDW BLD-RTO: 12.7 FL (ref 11.5–15)
PLATELET # BLD: 312 E9/L (ref 130–450)
PMV BLD AUTO: 10.4 FL (ref 7–12)
POTASSIUM SERPL-SCNC: 4.1 MMOL/L (ref 3.5–5)
PROTHROMBIN TIME: 10.4 SEC (ref 9.3–12.4)
RBC # BLD: 4.72 E12/L (ref 3.5–5.5)
SARS-COV-2, NAAT: NOT DETECTED
SODIUM BLD-SCNC: 140 MMOL/L (ref 132–146)
T4 FREE: 1.23 NG/DL (ref 0.93–1.7)
TOTAL PROTEIN: 7.8 G/DL (ref 6.4–8.3)
TSH SERPL DL<=0.05 MIU/L-ACNC: 2.55 UIU/ML (ref 0.27–4.2)
WBC # BLD: 6.9 E9/L (ref 4.5–11.5)

## 2021-01-25 PROCEDURE — C1898 LEAD, PMKR, OTHER THAN TRANS: HCPCS

## 2021-01-25 PROCEDURE — 2500000003 HC RX 250 WO HCPCS

## 2021-01-25 PROCEDURE — 71045 X-RAY EXAM CHEST 1 VIEW: CPT

## 2021-01-25 PROCEDURE — G0378 HOSPITAL OBSERVATION PER HR: HCPCS

## 2021-01-25 PROCEDURE — 93308 TTE F-UP OR LMTD: CPT

## 2021-01-25 PROCEDURE — U0002 COVID-19 LAB TEST NON-CDC: HCPCS

## 2021-01-25 PROCEDURE — 33208 INSRT HEART PM ATRIAL & VENT: CPT

## 2021-01-25 PROCEDURE — 6370000000 HC RX 637 (ALT 250 FOR IP): Performed by: NURSE PRACTITIONER

## 2021-01-25 PROCEDURE — 99222 1ST HOSP IP/OBS MODERATE 55: CPT | Performed by: STUDENT IN AN ORGANIZED HEALTH CARE EDUCATION/TRAINING PROGRAM

## 2021-01-25 PROCEDURE — 80053 COMPREHEN METABOLIC PANEL: CPT

## 2021-01-25 PROCEDURE — 93005 ELECTROCARDIOGRAM TRACING: CPT | Performed by: NURSE PRACTITIONER

## 2021-01-25 PROCEDURE — 2580000003 HC RX 258

## 2021-01-25 PROCEDURE — 6360000002 HC RX W HCPCS

## 2021-01-25 PROCEDURE — C1894 INTRO/SHEATH, NON-LASER: HCPCS

## 2021-01-25 PROCEDURE — 6370000000 HC RX 637 (ALT 250 FOR IP): Performed by: STUDENT IN AN ORGANIZED HEALTH CARE EDUCATION/TRAINING PROGRAM

## 2021-01-25 PROCEDURE — 85610 PROTHROMBIN TIME: CPT

## 2021-01-25 PROCEDURE — 84443 ASSAY THYROID STIM HORMONE: CPT

## 2021-01-25 PROCEDURE — 2580000003 HC RX 258: Performed by: NURSE ANESTHETIST, CERTIFIED REGISTERED

## 2021-01-25 PROCEDURE — 2580000003 HC RX 258: Performed by: INTERNAL MEDICINE

## 2021-01-25 PROCEDURE — 83735 ASSAY OF MAGNESIUM: CPT

## 2021-01-25 PROCEDURE — 3700000000 HC ANESTHESIA ATTENDED CARE

## 2021-01-25 PROCEDURE — 2709999900 HC NON-CHARGEABLE SUPPLY

## 2021-01-25 PROCEDURE — 6360000002 HC RX W HCPCS: Performed by: NURSE ANESTHETIST, CERTIFIED REGISTERED

## 2021-01-25 PROCEDURE — 85027 COMPLETE CBC AUTOMATED: CPT

## 2021-01-25 PROCEDURE — C1785 PMKR, DUAL, RATE-RESP: HCPCS

## 2021-01-25 PROCEDURE — 36415 COLL VENOUS BLD VENIPUNCTURE: CPT

## 2021-01-25 PROCEDURE — 84439 ASSAY OF FREE THYROXINE: CPT

## 2021-01-25 PROCEDURE — 93005 ELECTROCARDIOGRAM TRACING: CPT | Performed by: STUDENT IN AN ORGANIZED HEALTH CARE EDUCATION/TRAINING PROGRAM

## 2021-01-25 PROCEDURE — G0379 DIRECT REFER HOSPITAL OBSERV: HCPCS

## 2021-01-25 PROCEDURE — 33208 INSRT HEART PM ATRIAL & VENT: CPT | Performed by: INTERNAL MEDICINE

## 2021-01-25 PROCEDURE — 3700000001 HC ADD 15 MINUTES (ANESTHESIA)

## 2021-01-25 RX ORDER — PROPOFOL 10 MG/ML
INJECTION, EMULSION INTRAVENOUS CONTINUOUS PRN
Status: DISCONTINUED | OUTPATIENT
Start: 2021-01-25 | End: 2021-01-25 | Stop reason: SDUPTHER

## 2021-01-25 RX ORDER — ATORVASTATIN CALCIUM 10 MG/1
10 TABLET, FILM COATED ORAL NIGHTLY
Status: DISCONTINUED | OUTPATIENT
Start: 2021-01-26 | End: 2021-01-27 | Stop reason: HOSPADM

## 2021-01-25 RX ORDER — SODIUM CHLORIDE 0.9 % (FLUSH) 0.9 %
10 SYRINGE (ML) INJECTION PRN
Status: DISCONTINUED | OUTPATIENT
Start: 2021-01-25 | End: 2021-01-27 | Stop reason: HOSPADM

## 2021-01-25 RX ORDER — PROPOFOL 10 MG/ML
INJECTION, EMULSION INTRAVENOUS PRN
Status: DISCONTINUED | OUTPATIENT
Start: 2021-01-25 | End: 2021-01-25 | Stop reason: SDUPTHER

## 2021-01-25 RX ORDER — DEXTROSE MONOHYDRATE 50 MG/ML
INJECTION, SOLUTION INTRAVENOUS CONTINUOUS PRN
Status: DISCONTINUED | OUTPATIENT
Start: 2021-01-25 | End: 2021-01-25 | Stop reason: SDUPTHER

## 2021-01-25 RX ORDER — OXYCODONE HYDROCHLORIDE 5 MG/1
5 TABLET ORAL EVERY 6 HOURS PRN
Status: DISCONTINUED | OUTPATIENT
Start: 2021-01-25 | End: 2021-01-27 | Stop reason: HOSPADM

## 2021-01-25 RX ORDER — MIDAZOLAM HYDROCHLORIDE 1 MG/ML
INJECTION INTRAMUSCULAR; INTRAVENOUS PRN
Status: DISCONTINUED | OUTPATIENT
Start: 2021-01-25 | End: 2021-01-25 | Stop reason: SDUPTHER

## 2021-01-25 RX ORDER — ACETAMINOPHEN 650 MG/1
650 SUPPOSITORY RECTAL EVERY 6 HOURS PRN
Status: DISCONTINUED | OUTPATIENT
Start: 2021-01-25 | End: 2021-01-25

## 2021-01-25 RX ORDER — VANCOMYCIN HYDROCHLORIDE 1 G/20ML
INJECTION, POWDER, LYOPHILIZED, FOR SOLUTION INTRAVENOUS PRN
Status: DISCONTINUED | OUTPATIENT
Start: 2021-01-25 | End: 2021-01-25 | Stop reason: SDUPTHER

## 2021-01-25 RX ORDER — SODIUM CHLORIDE 0.9 % (FLUSH) 0.9 %
10 SYRINGE (ML) INJECTION EVERY 12 HOURS SCHEDULED
Status: DISCONTINUED | OUTPATIENT
Start: 2021-01-25 | End: 2021-01-27 | Stop reason: HOSPADM

## 2021-01-25 RX ORDER — FENTANYL CITRATE 50 UG/ML
INJECTION, SOLUTION INTRAMUSCULAR; INTRAVENOUS PRN
Status: DISCONTINUED | OUTPATIENT
Start: 2021-01-25 | End: 2021-01-25 | Stop reason: SDUPTHER

## 2021-01-25 RX ORDER — SODIUM CHLORIDE 9 MG/ML
INJECTION, SOLUTION INTRAVENOUS CONTINUOUS PRN
Status: DISCONTINUED | OUTPATIENT
Start: 2021-01-25 | End: 2021-01-25 | Stop reason: SDUPTHER

## 2021-01-25 RX ORDER — AMLODIPINE BESYLATE 2.5 MG/1
2.5 TABLET ORAL 2 TIMES DAILY
Status: DISCONTINUED | OUTPATIENT
Start: 2021-01-26 | End: 2021-01-27 | Stop reason: HOSPADM

## 2021-01-25 RX ORDER — ATORVASTATIN CALCIUM 10 MG/1
10 TABLET, FILM COATED ORAL DAILY
Status: DISCONTINUED | OUTPATIENT
Start: 2021-01-26 | End: 2021-01-25

## 2021-01-25 RX ORDER — ACETAMINOPHEN 325 MG/1
650 TABLET ORAL EVERY 6 HOURS PRN
Status: DISCONTINUED | OUTPATIENT
Start: 2021-01-25 | End: 2021-01-27 | Stop reason: HOSPADM

## 2021-01-25 RX ADMIN — PROPOFOL 30 MG: 10 INJECTION, EMULSION INTRAVENOUS at 14:45

## 2021-01-25 RX ADMIN — MIDAZOLAM 1 MG: 1 INJECTION INTRAMUSCULAR; INTRAVENOUS at 14:26

## 2021-01-25 RX ADMIN — VANCOMYCIN HYDROCHLORIDE 1000 MG: 1 INJECTION, POWDER, LYOPHILIZED, FOR SOLUTION INTRAVENOUS at 14:20

## 2021-01-25 RX ADMIN — SODIUM CHLORIDE: 9 INJECTION, SOLUTION INTRAVENOUS at 14:08

## 2021-01-25 RX ADMIN — MIDAZOLAM 1 MG: 1 INJECTION INTRAMUSCULAR; INTRAVENOUS at 14:20

## 2021-01-25 RX ADMIN — OXYCODONE HYDROCHLORIDE 5 MG: 5 TABLET ORAL at 18:45

## 2021-01-25 RX ADMIN — FENTANYL CITRATE 50 MCG: 50 INJECTION, SOLUTION INTRAMUSCULAR; INTRAVENOUS at 14:40

## 2021-01-25 RX ADMIN — Medication 10 ML: at 21:30

## 2021-01-25 RX ADMIN — PROPOFOL 25 MG: 10 INJECTION, EMULSION INTRAVENOUS at 14:30

## 2021-01-25 RX ADMIN — PROPOFOL 25 MCG/KG/MIN: 10 INJECTION, EMULSION INTRAVENOUS at 14:25

## 2021-01-25 RX ADMIN — DEXTROSE MONOHYDRATE: 50 INJECTION, SOLUTION INTRAVENOUS at 14:20

## 2021-01-25 RX ADMIN — ACETAMINOPHEN 650 MG: 325 TABLET, FILM COATED ORAL at 16:05

## 2021-01-25 RX ADMIN — FENTANYL CITRATE 50 MCG: 50 INJECTION, SOLUTION INTRAMUSCULAR; INTRAVENOUS at 14:45

## 2021-01-25 ASSESSMENT — PULMONARY FUNCTION TESTS
PIF_VALUE: 16
PIF_VALUE: 15
PIF_VALUE: 16
PIF_VALUE: 15
PIF_VALUE: 16
PIF_VALUE: 16
PIF_VALUE: 14
PIF_VALUE: 15
PIF_VALUE: 15
PIF_VALUE: 14
PIF_VALUE: 15
PIF_VALUE: 16
PIF_VALUE: 15
PIF_VALUE: 16
PIF_VALUE: 15
PIF_VALUE: 16
PIF_VALUE: 15
PIF_VALUE: 16
PIF_VALUE: 15
PIF_VALUE: 16
PIF_VALUE: 15
PIF_VALUE: 16
PIF_VALUE: 15
PIF_VALUE: 16
PIF_VALUE: 15
PIF_VALUE: 16
PIF_VALUE: 14
PIF_VALUE: 15
PIF_VALUE: 15
PIF_VALUE: 16

## 2021-01-25 ASSESSMENT — PAIN DESCRIPTION - FREQUENCY: FREQUENCY: INTERMITTENT

## 2021-01-25 ASSESSMENT — PAIN DESCRIPTION - DESCRIPTORS: DESCRIPTORS: DISCOMFORT;TENDER;SORE

## 2021-01-25 ASSESSMENT — PAIN DESCRIPTION - LOCATION: LOCATION: CHEST

## 2021-01-25 ASSESSMENT — LIFESTYLE VARIABLES: SMOKING_STATUS: 0

## 2021-01-25 ASSESSMENT — PAIN SCALES - GENERAL: PAINLEVEL_OUTOF10: 3

## 2021-01-25 ASSESSMENT — PAIN DESCRIPTION - PAIN TYPE: TYPE: ACUTE PAIN

## 2021-01-25 ASSESSMENT — PAIN DESCRIPTION - ONSET: ONSET: ON-GOING

## 2021-01-25 NOTE — ANESTHESIA POSTPROCEDURE EVALUATION
Department of Anesthesiology  Postprocedure Note    Patient: Diane Frank  MRN: [id number]  YOB: 1955  Date of evaluation: 1/25/2021  Time:  5:28 PM     Procedure Summary     Date: 01/25/21 Room / Location: [facility] [place] CATH LAB    Anesthesia Start: 3788 Anesthesia Stop:     Procedure: PACEMAKER IMPLANT W/ANES Diagnosis:     Scheduled Providers:  Responsible Provider: Emerita Burrell MD    Anesthesia Type: MAC ASA Status: 3          Anesthesia Type: MAC    Maggie Phase I:      Maggie Phase II:      Last vitals: Reviewed and per EMR flowsheets.        Anesthesia Post Evaluation    Patient location during evaluation: PACU  Patient participation: complete - patient participated  Level of consciousness: awake  Pain score: 3  Airway patency: patent  Nausea & Vomiting: no nausea and no vomiting  Complications: no  Cardiovascular status: blood pressure returned to baseline  Respiratory status: acceptable  Hydration status: euvolemic

## 2021-01-25 NOTE — ANESTHESIA PRE PROCEDURE
Department of Anesthesiology  Preprocedure Note       Name:  Kori Ledbetter   Age:  77 y.o.  :  1955                                          MRN:  [id number]         Date:  2021      Surgeon: * No surgeons listed *    Procedure: * No procedures listed *    Medications prior to admission:   Prior to Admission medications    Medication Sig Start Date End Date Taking? Authorizing Provider   amLODIPine (NORVASC) 2.5 MG tablet 2.5 mg 2 times daily  21  Yes Historical Provider, MD   Glucosamine 500 MG CAPS Take 500 mg by mouth daily   Yes Historical Provider, MD   Pumpkin Seed-Soy Germ (AZO BLADDER CONTROL/GO-LESS PO) Take by mouth   Yes Historical Provider, MD   simvastatin (ZOCOR) 10 MG tablet Take 1 tablet by mouth daily one daily for cholesterol--no food 20  Yes Fuad Rojas DO   lisinopril (PRINIVIL;ZESTRIL) 20 MG tablet Take 1 tablet by mouth daily 20  Yes Ted Rai,    Cholecalciferol (VITAMIN D3) 1.25 MG (67559 UT) CAPS Take by mouth once a week   Yes Historical Provider, MD   Calcium Carbonate-Vitamin D (CALCIUM + D PO) Take 1 tablet by mouth daily. Yes Historical Provider, MD   VITAMIN E PO Take 1 capsule by mouth daily.  Dose unknown   Yes Historical Provider, MD       Current medications:    Current Facility-Administered Medications   Medication Dose Route Frequency Provider Last Rate Last Admin    perflutren lipid microspheres (DEFINITY) injection 1.65 mg  1.5 mL Intravenous ONCE PRN Torrence Fleischer, MD        sodium chloride flush 0.9 % injection 10 mL  10 mL Intravenous 2 times per day Torrence Fleischer, MD        sodium chloride flush 0.9 % injection 10 mL  10 mL Intravenous PRN Torrence Fleischer, MD        magnesium hydroxide (MILK OF MAGNESIA) 400 MG/5ML suspension 30 mL  30 mL Oral Daily PRN Torrence Fleischer, MD        acetaminophen (TYLENOL) tablet 650 mg  650 mg Oral Q6H PRN Torrence Fleischer, MD   650 mg at 21 4066 Or    acetaminophen (TYLENOL) suppository 650 mg  650 mg Rectal Q6H PRN Pelon Dias MD        sodium chloride flush 0.9 % injection 10 mL  10 mL Intravenous 2 times per day Pelon Dias MD        sodium chloride flush 0.9 % injection 10 mL  10 mL Intravenous PRN Pelon Dias MD           Allergies:  No Known Allergies    Problem List:    Patient Active Problem List   Diagnosis Code    Other hyperlipidemia E78.49    Complete AV block ([address] I44.2       Past Medical History:        Diagnosis Date    Contusion of back     Displacement of lumbar intervertebral disc without myelopathy     Fracture 05/2011    Pelvis    Hyperlipidemia     Hypertension     IBS (irritable bowel syndrome)     L4-L5 disc bulge     Vitamin D deficiency        Past Surgical History:        Procedure Laterality Date    CHOLECYSTECTOMY      KIDNEY SURGERY      Kidney donation to her sister. Social History:    Social History     Tobacco Use    Smoking status: Never Smoker    Smokeless tobacco: Never Used   Substance Use Topics    Alcohol use: No                                Counseling given: Not Answered      Vital Signs (Current):   Vitals:    01/25/21 1240 01/25/21 1245 01/25/21 1333 01/25/21 1630   BP: 122/84  (!) 151/74 137/68   Pulse: 74  83 83   Resp: 14  20 18   Temp: 97.8 °F (36.6 °C)  98 °F (36.7 °C) 98.2 °F (36.8 °C)   TempSrc: Temporal   Temporal   SpO2:   98% 95%   Weight:  195 lb (88.5 kg) 195 lb (88.5 kg)    Height:  5' 4\" (1.626 m) 5' 4\" (1.626 m)                                               BP Readings from Last 3 Encounters:   01/25/21 137/68   01/25/21 137/84   01/20/21 122/84       NPO Status:                                                                                 BMI:   Wt Readings from Last 3 Encounters:   01/25/21 195 lb (88.5 kg)   01/20/21 200 lb (90.7 kg)   04/17/20 198 lb 1.6 oz (89.9 kg)     Body mass index is 33.47 kg/m².     CBC:   Lab Results Component Value Date    WBC 6.9 01/25/2021    RBC 4.72 01/25/2021    HGB 13.1 01/25/2021    HCT 42.4 01/25/2021    MCV 89.8 01/25/2021    RDW 12.7 01/25/2021     01/25/2021       CMP:   Lab Results   Component Value Date     01/25/2021    K 4.1 01/25/2021     01/25/2021    CO2 29 01/25/2021    BUN 20 01/25/2021    CREATININE 1.1 01/25/2021    GFRAA 60 01/25/2021    LABGLOM 49 01/25/2021    GLUCOSE 97 01/25/2021    PROT 7.8 01/25/2021    CALCIUM 9.5 01/25/2021    BILITOT 0.5 01/25/2021    ALKPHOS 70 01/25/2021    AST 26 01/25/2021    ALT 30 01/25/2021       POC Tests: No results for input(s): POCGLU, POCNA, POCK, POCCL, POCBUN, POCHEMO, POCHCT in the last 72 hours.     Coags:   Lab Results   Component Value Date    PROTIME 10.4 01/25/2021    INR 0.9 01/25/2021       HCG (If Applicable): No results found for: PREGTESTUR, PREGSERUM, HCG, HCGQUANT     ABGs: No results found for: PHART, PO2ART, CDF5DYC, GTS2VXM, BEART, K8DFGZKU     Type & Screen (If Applicable):  No results found for: LABABO, LABRH    Drug/Infectious Status (If Applicable):  No results found for: HIV, HEPCAB    COVID-19 Screening (If Applicable):   Lab Results   Component Value Date    COVID19 Not Detected 01/25/2021         Anesthesia Evaluation    Airway: Mallampati: II        Dental:          Pulmonary:                              Cardiovascular:                      Neuro/Psych:               GI/Hepatic/Renal:             Endo/Other:                     Abdominal:           Vascular:                                        Anesthesia Plan        Ramin Baker MD   1/25/2021

## 2021-01-25 NOTE — ANESTHESIA PRE PROCEDURE
 acetaminophen (TYLENOL) suppository 650 mg  650 mg Rectal Q6H PRN BALBINA Muse - CNP           Allergies:  No Known Allergies    Problem List:    Patient Active Problem List   Diagnosis Code    Other hyperlipidemia E78.49    Complete AV block ([facility] [address] I44.2       Past Medical History:        Diagnosis Date    Contusion of back     Displacement of lumbar intervertebral disc without myelopathy     Fracture 05/2011    Pelvis    Hyperlipidemia     Hypertension     IBS (irritable bowel syndrome)     L4-L5 disc bulge     Vitamin D deficiency        Past Surgical History:        Procedure Laterality Date    CHOLECYSTECTOMY      KIDNEY SURGERY      Kidney donation to her sister. Social History:    Social History     Tobacco Use    Smoking status: Never Smoker    Smokeless tobacco: Never Used   Substance Use Topics    Alcohol use: No                                Counseling given: Not Answered      Vital Signs (Current):   Vitals:    01/25/21 1240 01/25/21 1245 01/25/21 1333   BP: 122/84  (!) 151/74   Pulse: 74  83   Resp: 14  20   Temp: 36.6 °C (97.8 °F)  36.7 °C (98 °F)   TempSrc: Temporal     SpO2:   98%   Weight:  195 lb (88.5 kg) 195 lb (88.5 kg)   Height:  5' 4\" (1.626 m) 5' 4\" (1.626 m)                                              BP Readings from Last 3 Encounters:   01/25/21 (!) 151/74   01/20/21 122/84   04/17/20 (!) 140/76       NPO Status:  >8 hrs                                                                               BMI:   Wt Readings from Last 3 Encounters:   01/25/21 195 lb (88.5 kg)   01/20/21 200 lb (90.7 kg)   04/17/20 198 lb 1.6 oz (89.9 kg)     Body mass index is 33.47 kg/m².     CBC:   Lab Results   Component Value Date    WBC 6.9 01/25/2021    RBC 4.72 01/25/2021    HGB 13.1 01/25/2021    HCT 42.4 01/25/2021    MCV 89.8 01/25/2021    RDW 12.7 01/25/2021     01/25/2021       CMP:   Lab Results   Component Value Date     01/25/2021 systolic velocity of the left ICA is 148 cm/s with an ICA to CCA ratio   of 1.4. There is antegrade flow within bilateral vertebral arteries. There is atherosclerotic plaque within the distal common and proximal   internal carotid arteries bilaterally. There is no visible evidence   for hemodynamically significant stenosis. Thyroid nodules are present. Impression       No evidence for hemodynamically significant stenosis of the carotid   arteries based on NASCET criteria (0-49%)       No evidence for subclavian steal.            Patient MRN:  [id number]   : 1955   Age: 77 years   Gender: Female       Order Date:  2020 11:30 AM       EXAM: US RETROPERITONEAL DEL, US DUP ABD PEL RETRO SCROT COMPLETE           INDICATION: I10 Essential hypertension, malignant         COMPARISON: None       FINDINGS:     There is absence of the left kidney. Right kidney measures 12.1 x 5.8 cm. There is no hydronephrosis, renal   mass or renal calculi. Systolic flow velocity in the aorta is 98 cm/s. In the right renal   artery it is 177 cm/s. The RAR measures 1.8. Bladder is decompressed. Impression   Absence of left kidney. Normal Right kidney without renal artery stenosis                    Patient MRN: [id number]   : 1955   Age:  77 years   Gender: Female   Order Date: 2020 5:15 PM   Exam: XR CHEST PORTABLE   Number of Images: 1 view   Indication:   Syncope    Syncope    Comparison: None. Findings: The lungs are clear. There is no evidence of pulmonary infiltrate or   pleural effusion. The pulmonary vascularity is unremarkable. The   cardiac, hilar and mediastinal silhouettes are satisfactory. The bony   thorax demonstrates no gross abnormality.                Impression       NO ACUTE CARDIOPULMONARY PROCESS        Patient MRN:  [id number]   : 1955   Age: 77 years   Gender: Female       Order Date:  2020 5:15 PM

## 2021-01-25 NOTE — PROGRESS NOTES
Dr. William Muñoz notified that patient is still experiencing 5/10 pain in the mid chest not relieved by tylenol. Awaiting new orders.

## 2021-01-25 NOTE — OP NOTE
Operative Note    [facility]) Physicians- The Heart and [address] Electrophysiology  Procedure Report  PATIENT: [address] [facility] RECORD NUMBER: [id number]  DATE OF PROCEDURE:  1/25/2021  ATTENDING ELECTROPHYSIOLOGIST:Elisabeth Milligan MD  REFERRING PHYSICIAN: Dr. Sahara Sheldon    Procedure Performed:  1. Insertion of Dual Chamber Permanent Pacemaker (Medtronic- MRI conditional)  2. Left upper extremity venography  3. Fluoroscopy    Indication for Procedure:  3 77year old female with recurrent syncope and found to have up to 6 secs of ventricular standstill associated with syncope. Flouroscopy: 2.5 min  Complications: none immediately apparent  EBL: 10cc  Specimens: none  Contrast: 10cc    FINDINGS:  Implanted device information:  1. Pulse Generator is a Medtronic. Serial # [id number]  Placement: Left pectoral subcutaneous  Date of implant: 1/25/2021  2. Right atrial lead parameters are as follows:  Medtronic Model # [id number]. Serial # [id number]  Lead position: RA appendage  Date of implant: 1/25/2021  P-waves: 3.4 mV  Pacing threshold: 0.75 V at 0.4 ms. Impedance: 418 ohms. 3. Right ventricular lead parameters are as follows:  Medtronic Model [id number]. Serial # [id number]  Lead position: RV apical septum  Date of implant: 1/25/2021  R-waves: 20 mV  Pacing threshold: 0.5 V at 0.4 ms. Impedance: 703 ohms. 4. Bradycardia parameters:  Mode: DDD  Base Rate: 60  Max Tracking Rate: 120    DETAILS OF THE OPERATION: The risks, benefits, alternatives of the procedure were explained to patient and family. They consented and agreed to proceed. Written consent was obtained in the chart. Blood products are not routinely needed for such procedures. The patient was brought to the Electrophysiology lab in a fasting and non-sedated state. The patient had electrocardiographic and hemodynamic monitoring equipment placed.  During the case the patient was under the care of an anesthesiologist/CRNA team for screws were then tightened with a torque wrench. Tug tests were performed on both leads to insure they are adequately fixated. The device and the leads were then placed within the newly formed device pocket. Lead parameters were then rechecked via the device and deemed to be adequate. The incision was closed with 3 layers of absorbable suture, Vicryl. The deepest of which was a 2-0 interrupted stitch followed by a 2nd layer of 3-0 running stitch performed perpendicular to the deep layer and, lastly, a 4-0 subcuticular stitch. The skin was then prepped with benzoin solution, Steri-Strips, and island dressing were then applied. At the end of the case, the patient was hemodynamically stable and under the care of the anesthesiologist, the patient was brought back to the recovery area for post procedural monitoring. ASSESSMENT:  1. Successful implantation of a Medtronic dual chamber permanent pacemaker. RECOMMENDATIONS:  1. Stat portable chest x-ray to rule out pneumothorax and check lead placement now and tomorrow morning. 2. EKG to be performed now. 3. Post-procedural monitoring in the recovery area. 4. The patient will be observed overnight on Telemetry. 5. The patient will receive 24-hours of nieves-operative intravenous antibiotics. 6. The patient's device will be interrogated in the morning by a representative of the device . 7. The patient is to follow-up in device clinic within 2 weeks upon discharge. 8. The patient is advised follow all post-operative device and wound care instructions as per the information provided in the pre-operative clinic.     Larissa Serrano MD  Cardiac Electrophysiology  [facility]) Physicians  The Heart and Vascular [place]: [place] Electrophysiology  3:36 PM  1/25/2021

## 2021-01-25 NOTE — H&P
10 mL Intravenous PRN Sholes Kava, APRN - CNP        magnesium hydroxide (MILK OF MAGNESIA) 400 MG/5ML suspension 30 mL  30 mL Oral Daily PRN Sholes Kava, APRN - CNP        acetaminophen (TYLENOL) tablet 650 mg  650 mg Oral Q6H PRN Sholes Kava, APRN - CNP        Or    acetaminophen (TYLENOL) suppository 650 mg  650 mg Rectal Q6H PRN Sholes Kava, APRN - CNP            No Known Allergies    ROS:   Constitutional: Negative for fever, activity change and appetite change. HENT: Negative for epistaxis. Eyes: Negative for diploplia, blurred vision. Respiratory: Negative for cough, chest tightness, shortness of breath and wheezing. Cardiovascular: pertinent positives in HPI  Gastrointestinal: Negative for abdominal pain and blood in stool. All other review of systems are negative     PHYSICAL EXAM:   Vitals:    01/25/21 1240 01/25/21 1245   BP: 122/84    Pulse: 74    Resp: 14    Temp: 97.8 °F (36.6 °C)    TempSrc: Temporal    Weight:  195 lb (88.5 kg)   Height:  5' 4\" (1.626 m)   Limited exam due to COVID-19 pandemic. Constitutional: NAD, obese  Head: Normocephalic and atraumatic. Neck: supple and no JVD present  Cardiovascular: RRR  Pulmonary/Chest:  No respiratory distress, no audible wheeze  Abdominal: nondistended   Musculoskeletal: Normal range of motion of all extremities  Neurological: BACON x 4, grossly intact   Skin: Skin is warm and dry, CIED incision C/D/I without erythema, edema, or drainage  Extremity: no edema   Psychiatric: Normal mood and affect, A&O x3    I have personally reviewed the laboratory, cardiac diagnostic and radiographic testing as outlined below:    Data:    No results for input(s): WBC, HGB, HCT, PLT in the last 72 hours. No results for input(s): NA, K, CL, CO2, BUN, CREATININE, CALCIUM in the last 72 hours.     Invalid input(s): GLU, MAGNESIUM   Lab Results   Component Value Date    MG 2.0 04/16/2020     No results for input(s): TSH in the last 72 hours. No results for input(s): INR in the last 72 hours. EKG 01/25/2021: NSR rate 80 bpm.    Echocardiogram 04/16/2020: LVEF 60-65%, mild MD, Mild LVH. Outpatient Monitor 01/20/2021: I have independently reviewed all of the ECGs and rhythm strips per above     Assessment/Plan:  1. Sinus Node Dysfunction  -7 sec sinus pause with syncope. -Check TTE. LVEF 60-65% 04/16/2020.  -Check COVID. -Plan for Medtronic dual chamber pacemaker implant after above completed. Thank you for allowing me to participate in your patient's care. Please call me if there are any questions or concerns. Discussed with Dr. Neville Sun.    BALBINA Estrada - CNP  Cardiac Electrophysiology  [facility]) Physicians  The Heart and Vascular [place]: [place] Electrophysiology  1:06 PM  1/25/2021    Attending Supervising Physicians Attestation Statement  I was present with the nurse practitioner during the history and exam. I discussed the findings and plans with the nurse practitioner and agree as documented in his note     Electronically signed by Kevin Lewis DO on 1/25/21 at 2:58 PM EST

## 2021-01-26 ENCOUNTER — APPOINTMENT (OUTPATIENT)
Dept: CT IMAGING | Age: 66
End: 2021-01-26
Attending: INTERNAL MEDICINE
Payer: MEDICARE

## 2021-01-26 LAB
EKG ATRIAL RATE: 75 BPM
EKG ATRIAL RATE: 80 BPM
EKG P AXIS: 50 DEGREES
EKG P AXIS: 56 DEGREES
EKG P-R INTERVAL: 154 MS
EKG P-R INTERVAL: 156 MS
EKG Q-T INTERVAL: 378 MS
EKG Q-T INTERVAL: 398 MS
EKG QRS DURATION: 86 MS
EKG QRS DURATION: 86 MS
EKG QTC CALCULATION (BAZETT): 435 MS
EKG QTC CALCULATION (BAZETT): 444 MS
EKG R AXIS: 21 DEGREES
EKG R AXIS: 9 DEGREES
EKG T AXIS: 28 DEGREES
EKG T AXIS: 40 DEGREES
EKG VENTRICULAR RATE: 75 BPM
EKG VENTRICULAR RATE: 80 BPM

## 2021-01-26 PROCEDURE — 93010 ELECTROCARDIOGRAM REPORT: CPT | Performed by: INTERNAL MEDICINE

## 2021-01-26 PROCEDURE — G0378 HOSPITAL OBSERVATION PER HR: HCPCS

## 2021-01-26 PROCEDURE — 2580000003 HC RX 258: Performed by: INTERNAL MEDICINE

## 2021-01-26 PROCEDURE — 6370000000 HC RX 637 (ALT 250 FOR IP): Performed by: INTERNAL MEDICINE

## 2021-01-26 PROCEDURE — 6370000000 HC RX 637 (ALT 250 FOR IP): Performed by: STUDENT IN AN ORGANIZED HEALTH CARE EDUCATION/TRAINING PROGRAM

## 2021-01-26 PROCEDURE — 2709999900 HC NON-CHARGEABLE SUPPLY

## 2021-01-26 PROCEDURE — C1785 PMKR, DUAL, RATE-RESP: HCPCS

## 2021-01-26 PROCEDURE — 93308 TTE F-UP OR LMTD: CPT

## 2021-01-26 PROCEDURE — C1898 LEAD, PMKR, OTHER THAN TRANS: HCPCS

## 2021-01-26 PROCEDURE — 71250 CT THORAX DX C-: CPT

## 2021-01-26 PROCEDURE — 99024 POSTOP FOLLOW-UP VISIT: CPT | Performed by: STUDENT IN AN ORGANIZED HEALTH CARE EDUCATION/TRAINING PROGRAM

## 2021-01-26 PROCEDURE — C1894 INTRO/SHEATH, NON-LASER: HCPCS

## 2021-01-26 RX ADMIN — OXYCODONE HYDROCHLORIDE 5 MG: 5 TABLET ORAL at 01:23

## 2021-01-26 RX ADMIN — ACETAMINOPHEN 650 MG: 325 TABLET, FILM COATED ORAL at 18:29

## 2021-01-26 RX ADMIN — AMLODIPINE BESYLATE 2.5 MG: 2.5 TABLET ORAL at 21:37

## 2021-01-26 RX ADMIN — ATORVASTATIN CALCIUM 10 MG: 10 TABLET, FILM COATED ORAL at 00:43

## 2021-01-26 RX ADMIN — AMLODIPINE BESYLATE 2.5 MG: 2.5 TABLET ORAL at 08:53

## 2021-01-26 RX ADMIN — ACETAMINOPHEN 650 MG: 325 TABLET, FILM COATED ORAL at 04:37

## 2021-01-26 RX ADMIN — ATORVASTATIN CALCIUM 10 MG: 10 TABLET, FILM COATED ORAL at 21:37

## 2021-01-26 RX ADMIN — Medication 10 ML: at 21:37

## 2021-01-26 RX ADMIN — Medication 10 ML: at 10:26

## 2021-01-26 RX ADMIN — AMLODIPINE BESYLATE 2.5 MG: 2.5 TABLET ORAL at 00:43

## 2021-01-26 RX ADMIN — OXYCODONE HYDROCHLORIDE 5 MG: 5 TABLET ORAL at 14:44

## 2021-01-26 ASSESSMENT — PAIN DESCRIPTION - PROGRESSION
CLINICAL_PROGRESSION: NOT CHANGED

## 2021-01-26 ASSESSMENT — PAIN DESCRIPTION - ORIENTATION: ORIENTATION: MID

## 2021-01-26 ASSESSMENT — PAIN DESCRIPTION - LOCATION
LOCATION: CHEST
LOCATION: HEAD

## 2021-01-26 ASSESSMENT — PAIN SCALES - GENERAL
PAINLEVEL_OUTOF10: 5
PAINLEVEL_OUTOF10: 5
PAINLEVEL_OUTOF10: 3
PAINLEVEL_OUTOF10: 4
PAINLEVEL_OUTOF10: 3

## 2021-01-26 ASSESSMENT — PAIN DESCRIPTION - PAIN TYPE
TYPE: ACUTE PAIN
TYPE: ACUTE PAIN

## 2021-01-26 ASSESSMENT — PAIN DESCRIPTION - ONSET: ONSET: GRADUAL

## 2021-01-26 ASSESSMENT — PAIN DESCRIPTION - DESCRIPTORS: DESCRIPTORS: HEADACHE

## 2021-01-26 ASSESSMENT — PAIN - FUNCTIONAL ASSESSMENT: PAIN_FUNCTIONAL_ASSESSMENT: ACTIVITIES ARE NOT PREVENTED

## 2021-01-26 NOTE — PLAN OF CARE
Problem: Falls - Risk of:  Goal: Will remain free from falls  Description: Will remain free from falls  1/26/2021 0304 by Mary Medina RN  Outcome: Met This Shift     Problem: Pain:  Goal: Pain level will decrease  Description: Pain level will decrease  Outcome: Ongoing

## 2021-01-26 NOTE — PROGRESS NOTES
[address] [facility] and [address] [facility] Electrophysiology  Inpatient Progress Note  PATIENT: [address] [facility] RECORD NUMBER: [id number]  DATE OF SERVICE:  1/26/2021  ATTENDING ELECTROPHYSIOLOGIST: Bhargavi Rush DO   PRIMARY ELECTROPHYSIOLOGIST: Fransico Ulrich MD   Primary PHYSICIAN:  Madelaine Hobson MD  CHIEF COMPLAINT: Syncope. HPI: Patient with a history of recurrent syncope and HTN. She is managed by Dr Jameson Corado with norvasc, lisinopril and Zocor. A 30 day monitor that on 01/24/20 showed a sinus pause of 7 sec during waking hours that correlated with syncopal episode. She reports post syncope she is fatigue for hours yet the syncope is not associated with loss of bowel or bladder function these syncopal episode happen without regard to position or hydration status. She has no complaints of chest pain, palpitation, feeling of heart racing, orthopnea or PND. She was diagnosed with symptomatic sinus node dysfunction and had Medtronic dual chamber pacemaker implanted 1/25/21. Today, she complains of chest discomfort with supine position and deep inspiration. She denies any other complaints at this time. Prior cardiac testing:  · Device Interrogation/Reprogramming 01/26/21  · Make/Model Medtronic Donna CHAN · DOI 1/25/2021  · Battery Voltage/Longevity: 3.17V,  10+ years    · Mode DDD  60/120 ppm  · Pacing: A: 0.7%  RV: <0.1%    · Lead function:  · RA lead: Sensing: 3.6 mV  Impedance: 380 ohms   Threshold: 0.5 V @ 0.4 ms  · RV lead: Sensing: >20 mV  Impedance: 646 ohms   Threshold: 0.5 V @ 0.4 ms  · Arrhythmias: none  · Overall device function is normal  · All device programmable settings were evaluated per above and in the scanned document, along with iterative adjustments (capture thresholds) to assess and select the most appropriate final programming to provide for consistent delivery of the appropriate therapy and to verify function of the device.    · TTE (1/25/21): LVEF = 60-65%, normal.  · EKG (1/25/21): NSR rate 80 bpm.    · Outpatient Monitor (1/20/21):  7 second sinus pause     · TTE (4/16/20): LVEF 60-65%, mild MD, Mild LVH.      Patient Active Problem List    Diagnosis Date Noted    Complete AV block ([address] 01/25/2021    Other hyperlipidemia 04/17/2020     Past Medical History:   Diagnosis Date    Contusion of back     Displacement of lumbar intervertebral disc without myelopathy     Fracture 05/2011    Pelvis    Hyperlipidemia     Hypertension     IBS (irritable bowel syndrome)     L4-L5 disc bulge     Sinus node dysfunction ([address] 01/2020    Vitamin D deficiency      Family History   Problem Relation Age of Onset    Heart Disease Mother      Social History     Tobacco Use    Smoking status: Never Smoker    Smokeless tobacco: Never Used   Substance Use Topics    Alcohol use: No       Current Facility-Administered Medications   Medication Dose Route Frequency Provider Last Rate Last Admin    perflutren lipid microspheres (DEFINITY) injection 1.65 mg  1.5 mL Intravenous ONCE PRN Denver Band, APRN - NP        perflutren lipid microspheres (DEFINITY) injection 1.65 mg  1.5 mL Intravenous ONCE PRN Philipp Gleason MD        sodium chloride flush 0.9 % injection 10 mL  10 mL Intravenous 2 times per day Philipp Gleason MD   10 mL at 01/25/21 2130    sodium chloride flush 0.9 % injection 10 mL  10 mL Intravenous PRN Philipp Gleason MD        magnesium hydroxide (MILK OF MAGNESIA) 400 MG/5ML suspension 30 mL  30 mL Oral Daily PRN Shraddha Dooley MD        acetaminophen (TYLENOL) tablet 650 mg  650 mg Oral Q6H PRN Philipp Gleason MD   650 mg at 01/26/21 0437    sodium chloride flush 0.9 % injection 10 mL  10 mL Intravenous 2 times per day Philipp Gleason MD        sodium chloride flush 0.9 % injection 10 mL  10 mL Intravenous PRN Majorie Brighter Claudell Prose, MD        oxyCODONE (ROXICODONE) immediate release tablet 5 mg  5 mg Oral Q6H PRN Gennett Dub, DO   5 mg at 01/26/21 0123    amLODIPine (NORVASC) tablet 2.5 mg  2.5 mg Oral BID Gennett Dub, DO   2.5 mg at 01/26/21 3130    atorvastatin (LIPITOR) tablet 10 mg  10 mg Oral Nightly Gennett Dub, DO   10 mg at 01/26/21 0043        No Known Allergies    ROS:   Constitutional: Negative for fever, activity change and appetite change. HENT: Negative for epistaxis. Eyes: Negative for diploplia, blurred vision. Respiratory:mild shortness of breath associated with mid-sternal CP  Cardiovascular: pertinent positives in HPI  Gastrointestinal: Negative for abdominal pain and blood in stool. All other review of systems are negative     PHYSICAL EXAM:   Vitals:    01/25/21 1957 01/26/21 0000 01/26/21 0400 01/26/21 0838   BP: (!) 106/55 107/63 128/60 (!) 116/58   Pulse: 76 73 76 82   Resp: 18 16 16 16   Temp: 97 °F (36.1 °C) 96.9 °F (36.1 °C) 97.5 °F (36.4 °C) 97.8 °F (36.6 °C)   TempSrc: Temporal Temporal Temporal Temporal   SpO2: 93% 95% 92% 92%   Weight:       Height:       Limited exam due to COVID-19 pandemic. Constitutional: NAD, obese  Head: Normocephalic and atraumatic. Neck: supple and no JVD present  Cardiovascular: RRR  Pulmonary/Chest:  No respiratory distress, no audible wheeze  Abdominal: nondistended   Musculoskeletal: Normal range of motion of all extremities  Neurological: BACON x 4, grossly intact   Skin: Skin is warm and dry  Extremity: no edema   Psychiatric: Normal mood and affect, A&O x3  Pacemaker site: clean, dry with Aquacel dressing intact. Mild swelling.      Data:    Recent Labs     01/25/21  1215   WBC 6.9   HGB 13.1   HCT 42.4        Recent Labs     01/25/21  1215      K 4.1      CO2 29   BUN 20   CREATININE 1.1*   CALCIUM 9.5      Lab Results   Component Value Date    MG 2.0 01/25/2021     Recent Labs     01/25/21  1215   TSH 2.550     Recent Labs     01/25/21  1215   INR 0.9     Telemetry 01/26/2021: SR, Ap-Vs, PVC, HRs 60s-100 bpm    Assessment/Plan:  1. Sinus Node Dysfunction sp Medtronic dual chamber pacemaker (implant: 1/25/21)  -CXR okay. -Device check okay. -Site okay. -Patient complains of symptoms consistent with pericarditis. Recommend TTE and CT chest to evaluate for possible lead perforation.  -Possible discharge tomorrow vs lead revision pending above. Thank you for allowing me to participate in your patient's care. Discussed with Dr. Quin Gowers.    BALBINA Pickett - NP  Cardiac Electrophysiology  [facility]) Physicians  The Heart and Vascular [place]: [place] [facility]rophys[facility]  10:04 AM  1/26/2021    Attending Supervising Physicians Attestation Statement  I was present with the nurse practitioner during the history and exam. I discussed the findings and plans with the nurse practitioner and agree as documented in her note     Electronically signed by Belle Arellano DO on 1/26/21 at 2:06 PM EST

## 2021-01-26 NOTE — PLAN OF CARE
Problem: Pain:  Goal: Pain level will decrease  Description: Pain level will decrease  1/26/2021 0911 by Arielle Peterson RN  Outcome: Ongoing  1/26/2021 0304 by Kvng Alberts RN  Outcome: Ongoing

## 2021-01-26 NOTE — PLAN OF CARE
Problem: Falls - Risk of:  Goal: Will remain free from falls  Description: Will remain free from falls  1/26/2021 1631 by Julien Schmidt RN  Outcome: Met This Shift  1/26/2021 0304 by Angeles Glynn RN  Outcome: Met This Shift  Goal: Absence of physical injury  Description: Absence of physical injury  1/26/2021 1631 by Julien Schmidt RN  Outcome: Met This Shift  1/26/2021 0304 by Angeles Glynn RN  Outcome: Met This Shift     Problem: Pain:  Goal: Pain level will decrease  Description: Pain level will decrease  1/26/2021 1631 by Julien Schmidt RN  Outcome: Met This Shift  1/26/2021 0911 by Orlin Munroe RN  Outcome: Ongoing  1/26/2021 0304 by Angeles Glynn RN  Outcome: Ongoing  Goal: Control of acute pain  Description: Control of acute pain  Outcome: Met This Shift  Goal: Control of chronic pain  Description: Control of chronic pain  Outcome: Met This Shift

## 2021-01-26 NOTE — PATIENT CARE CONFERENCE
P Quality Flow/Interdisciplinary Rounds Progress Note        Quality Flow Rounds held on January 26, 2021    Disciplines Attending:  Bedside Nurse, ,  and Nursing Unit Luz Puga was admitted on 1/25/2021 11:55 AM    Anticipated Discharge Date:  Expected Discharge Date: 01/30/21    Disposition:    Reynaldo Score:  Reynaldo Scale Score: 21    Readmission Risk              Risk of Unplanned Readmission:        7           Discussed patient goal for the day, patient clinical progression, and barriers to discharge.   The following Goal(s) of the Day/Commitment(s) have been identified:  to get pacemaker checked and leads in place      [facility]  January 26, 2021

## 2021-01-27 VITALS
BODY MASS INDEX: 33.77 KG/M2 | HEIGHT: 64 IN | TEMPERATURE: 98.4 F | WEIGHT: 197.8 LBS | OXYGEN SATURATION: 92 % | SYSTOLIC BLOOD PRESSURE: 129 MMHG | RESPIRATION RATE: 16 BRPM | HEART RATE: 86 BPM | DIASTOLIC BLOOD PRESSURE: 59 MMHG

## 2021-01-27 PROBLEM — I44.2 COMPLETE AV BLOCK (HCC): Status: RESOLVED | Noted: 2021-01-25 | Resolved: 2021-01-27

## 2021-01-27 LAB
ANION GAP SERPL CALCULATED.3IONS-SCNC: 12 MMOL/L (ref 7–16)
BUN BLDV-MCNC: 19 MG/DL (ref 8–23)
C-REACTIVE PROTEIN: 2 MG/DL (ref 0–0.4)
CALCIUM SERPL-MCNC: 8.8 MG/DL (ref 8.6–10.2)
CHLORIDE BLD-SCNC: 105 MMOL/L (ref 98–107)
CO2: 25 MMOL/L (ref 22–29)
CREAT SERPL-MCNC: 1 MG/DL (ref 0.5–1)
GFR AFRICAN AMERICAN: >60
GFR NON-AFRICAN AMERICAN: 55 ML/MIN/1.73
GLUCOSE BLD-MCNC: 105 MG/DL (ref 74–99)
HCT VFR BLD CALC: 34.8 % (ref 34–48)
HEMOGLOBIN: 11.2 G/DL (ref 11.5–15.5)
MCH RBC QN AUTO: 28.7 PG (ref 26–35)
MCHC RBC AUTO-ENTMCNC: 32.2 % (ref 32–34.5)
MCV RBC AUTO: 89.2 FL (ref 80–99.9)
PDW BLD-RTO: 13.2 FL (ref 11.5–15)
PLATELET # BLD: 199 E9/L (ref 130–450)
PMV BLD AUTO: 10.6 FL (ref 7–12)
POTASSIUM SERPL-SCNC: 4.2 MMOL/L (ref 3.5–5)
RBC # BLD: 3.9 E12/L (ref 3.5–5.5)
SEDIMENTATION RATE, ERYTHROCYTE: 24 MM/HR (ref 0–20)
SODIUM BLD-SCNC: 142 MMOL/L (ref 132–146)
WBC # BLD: 8.3 E9/L (ref 4.5–11.5)

## 2021-01-27 PROCEDURE — G0378 HOSPITAL OBSERVATION PER HR: HCPCS

## 2021-01-27 PROCEDURE — 80048 BASIC METABOLIC PNL TOTAL CA: CPT

## 2021-01-27 PROCEDURE — 2580000003 HC RX 258: Performed by: INTERNAL MEDICINE

## 2021-01-27 PROCEDURE — 85027 COMPLETE CBC AUTOMATED: CPT

## 2021-01-27 PROCEDURE — 85651 RBC SED RATE NONAUTOMATED: CPT

## 2021-01-27 PROCEDURE — 86140 C-REACTIVE PROTEIN: CPT

## 2021-01-27 PROCEDURE — 99024 POSTOP FOLLOW-UP VISIT: CPT | Performed by: STUDENT IN AN ORGANIZED HEALTH CARE EDUCATION/TRAINING PROGRAM

## 2021-01-27 PROCEDURE — 6370000000 HC RX 637 (ALT 250 FOR IP): Performed by: STUDENT IN AN ORGANIZED HEALTH CARE EDUCATION/TRAINING PROGRAM

## 2021-01-27 PROCEDURE — 36415 COLL VENOUS BLD VENIPUNCTURE: CPT

## 2021-01-27 RX ADMIN — Medication 10 ML: at 07:51

## 2021-01-27 RX ADMIN — OXYCODONE HYDROCHLORIDE 5 MG: 5 TABLET ORAL at 07:50

## 2021-01-27 RX ADMIN — AMLODIPINE BESYLATE 2.5 MG: 2.5 TABLET ORAL at 07:50

## 2021-01-27 ASSESSMENT — PAIN DESCRIPTION - ORIENTATION: ORIENTATION: LEFT;MID;UPPER

## 2021-01-27 ASSESSMENT — PAIN SCALES - GENERAL
PAINLEVEL_OUTOF10: 4
PAINLEVEL_OUTOF10: 4
PAINLEVEL_OUTOF10: 3

## 2021-01-27 ASSESSMENT — PAIN DESCRIPTION - PAIN TYPE: TYPE: ACUTE PAIN;SURGICAL PAIN

## 2021-01-27 NOTE — DISCHARGE SUMMARY
[facility]iac Electrophysiology Discharge Summary    Brianna Hazard  1955    77 y.o.  female  PCP: Sveta Trinh MD    Admission Date:1/25/2021      Discharge Date: 01/27/21    Patient Active Problem List   Diagnosis    Other hyperlipidemia        Nena Hashimoto   Home Medication Instructions VNQ:[id number]    Printed on:01/27/21 6573   Medication Information                      amLODIPine (NORVASC) 2.5 MG tablet  2.5 mg 2 times daily              Calcium Carbonate-Vitamin D (CALCIUM + D PO)  Take 1 tablet by mouth daily. Cholecalciferol (VITAMIN D3) 1.25 MG (59421 UT) CAPS  Take by mouth once a week             Glucosamine 500 MG CAPS  Take 500 mg by mouth daily             lisinopril (PRINIVIL;ZESTRIL) 20 MG tablet  Take 1 tablet by mouth daily             Pumpkin Seed-Soy Germ (AZO BLADDER CONTROL/GO-LESS PO)  Take by mouth             simvastatin (ZOCOR) 10 MG tablet  Take 1 tablet by mouth daily one daily for cholesterol--no food             VITAMIN E PO  Take 1 capsule by mouth daily. Dose unknown                 Hospital Course: Ms Li Trinh is a 77year-old female with a history of HTN, recurrent syncope, sinus node dysfunction, and sinus pause of 7 seconds who was admitted for dual chamber pacemaker implantation. The Medtronic pacemaker was implanted on 1/25/2021. POD #1 she complained of chest pain and shortness-of-breath on inspiration. The  post-op device interrogation was normal. Her post-op CXR was stable. A limited echocardiogram (1/26/2021) showed trivial pericardial effusion; no hemodynamic compromise. Inflammatory markers were elevated. 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 CT scan of the chest without contrast (1/27/2021) reported no pacemaker lead perforation with leads in appropriate position.      Procedures Performed: Dual chamber Medtronic MRI conditional pacemaker implantation. Disposition: The patient was discharged to home in stable condition on the above medications. She was asked to continue Tylenol 650 mg Q6 hours for pain and call the office in one week with symptom update.  Office device clinic follow-up  02/11/2021 at 6 am.      BALBINA Garvey-CNP, AGCNS - discussed with Dr Katy [facility]    Attending Supervising Physicians Attestation Statement  I was present with the nurse practitioner during the history and exam. I discussed the findings and plans with the nurse practitioner and agree as documented in her note     Electronically signed by Sumanth Escobar DO on 1/27/21 at 7:30 PM EST

## 2021-01-27 NOTE — PROGRESS NOTES
P Quality Flow/Interdisciplinary Rounds Progress Note        Quality Flow Rounds held on January 27, 2021    Disciplines Attending:  Bedside Nurse, ,  and Nursing Unit Luz Puga was admitted on 1/25/2021 11:55 AM    Anticipated Discharge Date:  Expected Discharge Date: 01/30/21    Disposition:    Reynaldo Score:  Reynaldo Scale Score: 23    Readmission Risk              Risk of Unplanned Readmission:        8           Discussed patient goal for the day, patient clinical progression, and barriers to discharge. The following Goal(s) of the Day/Commitment(s) have been identified:  Awaiting EP recommendations/plan.        [facility]  January 27, 2021

## 2021-02-11 ENCOUNTER — NURSE ONLY (OUTPATIENT)
Dept: NON INVASIVE DIAGNOSTICS | Age: 66
End: 2021-02-11
Payer: MEDICARE

## 2021-02-11 DIAGNOSIS — R55 SYNCOPE, UNSPECIFIED SYNCOPE TYPE: ICD-10-CM

## 2021-02-11 DIAGNOSIS — Z95.0 CARDIAC PACEMAKER IN SITU: Primary | ICD-10-CM

## 2021-02-11 PROCEDURE — 93280 PM DEVICE PROGR EVAL DUAL: CPT | Performed by: INTERNAL MEDICINE

## 2021-02-11 NOTE — PATIENT INSTRUCTIONS
Continue arm restrictions until 2-  You may shower starting today. Call if any signs or symptoms of infection [phone] ext: [phone]  Fevers, chills, redness, swelling or drainage.      For monitor questions:   [email or web address] Stay connected: [phone]

## 2021-02-11 NOTE — PROGRESS NOTES
See [employer] [facility] report. Wound and pacemaker check after implant.      Landen Howard RN, BSN  [facility]

## 2021-02-12 ENCOUNTER — TELEPHONE (OUTPATIENT)
Dept: NON INVASIVE DIAGNOSTICS | Age: 66
End: 2021-02-12

## 2021-02-12 NOTE — TELEPHONE ENCOUNTER
Patient is scheduled for repeat wound check on 2/23/2021 and office visit with Dr Komal Huang in March.

## 2021-02-12 NOTE — TELEPHONE ENCOUNTER
----- Message from Robert Reid MD sent at 2/12/2021  9:24 AM EST -----  FOLLOW UP IN 2 WEEKS FOR repeat wound check  thx  ----- Message -----  From: Nohemy Ontiveros In [facility]  Sent: 2/11/2021   4:04 PM EST  To: Robert Reid MD

## 2021-02-23 ENCOUNTER — NURSE ONLY (OUTPATIENT)
Dept: NON INVASIVE DIAGNOSTICS | Age: 66
End: 2021-02-23

## 2021-02-23 NOTE — PROGRESS NOTES
Recheck of pacemaker site: see wound photo. Dr. Chino Neves in exam room to see patient and look at site. Instructed to contact our office with any concerns. Will continue to monitor.      Chu Henderson RN, BSN  [facility]

## 2021-03-16 ENCOUNTER — TELEPHONE (OUTPATIENT)
Dept: NON INVASIVE DIAGNOSTICS | Age: 66
End: 2021-03-16

## 2021-03-17 ENCOUNTER — OFFICE VISIT (OUTPATIENT)
Dept: NON INVASIVE DIAGNOSTICS | Age: 66
End: 2021-03-17
Payer: MEDICARE

## 2021-03-17 VITALS
BODY MASS INDEX: 34.31 KG/M2 | HEIGHT: 64 IN | SYSTOLIC BLOOD PRESSURE: 126 MMHG | RESPIRATION RATE: 18 BRPM | DIASTOLIC BLOOD PRESSURE: 78 MMHG | WEIGHT: 201 LBS

## 2021-03-17 DIAGNOSIS — Z95.0 CARDIAC PACEMAKER IN SITU: Primary | ICD-10-CM

## 2021-03-17 DIAGNOSIS — R94.31 ABNORMAL EKG: ICD-10-CM

## 2021-03-17 DIAGNOSIS — R55 SYNCOPE, UNSPECIFIED SYNCOPE TYPE: ICD-10-CM

## 2021-03-17 DIAGNOSIS — I49.5 SINUS NODE DYSFUNCTION (HCC): ICD-10-CM

## 2021-03-17 PROCEDURE — 99024 POSTOP FOLLOW-UP VISIT: CPT | Performed by: INTERNAL MEDICINE

## 2021-03-17 PROCEDURE — 93280 PM DEVICE PROGR EVAL DUAL: CPT | Performed by: INTERNAL MEDICINE

## 2021-03-17 RX ORDER — SIMVASTATIN 20 MG
20 TABLET ORAL NIGHTLY
COMMUNITY
Start: 2021-02-11 | End: 2022-06-28

## 2021-03-17 RX ORDER — CEPHALEXIN 500 MG/1
500 CAPSULE ORAL 2 TIMES DAILY
Qty: 20 CAPSULE | Refills: 0 | Status: SHIPPED | OUTPATIENT
Start: 2021-03-17 | End: 2021-03-27

## 2021-03-17 ASSESSMENT — ENCOUNTER SYMPTOMS
ABDOMINAL DISTENTION: 0
COLOR CHANGE: 0
CHEST TIGHTNESS: 0
SINUS PRESSURE: 0
ABDOMINAL PAIN: 0
DIARRHEA: 0
EYE REDNESS: 0
COUGH: 0
SHORTNESS OF BREATH: 0
NAUSEA: 0
WHEEZING: 0

## 2021-03-17 NOTE — PROGRESS NOTES
Cardiac Electrophysiology Outpatient Progress Note    Michelle Vivas  1955  Date of Service: 3/17/2021  Referring Provider/PCP: Asad Alcantara MD  Chief Complaint:   Chief Complaint   Patient presents with    Loss of Consciousness     6 week f/u mdt-  Patient has no complaints         HISTORY OF PRESENT ILLNESS    Michelle Vivas presents to the office today for the management of these Electrophysiology conditions: syncope/pacemaker    Patient with a history of recurrent syncope and HTN. A 30 day monitor that on 01/24/20 showed a sinus pause of 7 sec during waking hours that correlated with syncopal episode. She underwent dual chamber pacemaker placement 1/25/21. Since her last office visit she reports no further syncope. Her device site looks healed and free from infection or erosion. There is however a small stitch present at the lateral edge of the incision. No draining or redness. She offers no complaints from a device POV. Currently denies any angina, syncope, dyspnea on exertion, paroxysmal nocturnal dyspnea and palpitations. The patient continues to be followed remotely.         Patient Active Problem List    Diagnosis Date Noted    Other hyperlipidemia 04/17/2020       Family History   Problem Relation Age of Onset    Heart Disease Mother        SOCIAL HISTORY   Social History     Socioeconomic History    Marital status:      Spouse name: Not on file    Number of children: Not on file    Years of education: Not on file    Highest education level: Not on file   Occupational History    Not on file   Social Needs    Financial resource strain: Not on file    Food insecurity     Worry: Not on file     Inability: Not on file    Transportation needs     Medical: Not on file     Non-medical: Not on file   Tobacco Use    Smoking status: Never Smoker    Smokeless tobacco: Never Used   Substance and Sexual Activity    Alcohol use: No    Drug use: Not on file    Sexual activity: Not on file   Lifestyle    Physical activity     Days per week: Not on file     Minutes per session: Not on file    Stress: Not on file   Relationships    Social connections     Talks on phone: Not on file     Gets together: Not on file     Attends Judaism service: Not on file     Active member of club or organization: Not on file     Attends meetings of clubs or organizations: Not on file     Relationship status: Not on file    Intimate partner violence     Fear of current or ex partner: Not on file     Emotionally abused: Not on file     Physically abused: Not on file     Forced sexual activity: Not on file   Other Topics Concern    Not on file   Social History Narrative    Not on file         Past Surgical History:   Procedure Laterality Date    CHOLECYSTECTOMY      KIDNEY SURGERY      Kidney donation to her sister.  PACEMAKER INSERTION Left 01/25/2021    DUAL PPM   (MEDTRONIC)   DR. GRAHAM       Current Outpatient Medications   Medication Sig Dispense Refill    simvastatin (ZOCOR) 20 MG tablet Take 20 mg by mouth nightly       cephALEXin (KEFLEX) 500 MG capsule Take 1 capsule by mouth 2 times daily for 10 days 20 capsule 0    amLODIPine (NORVASC) 2.5 MG tablet 2.5 mg 2 times daily       Glucosamine 500 MG CAPS Take 500 mg by mouth daily      Pumpkin Seed-Soy Germ (AZO BLADDER CONTROL/GO-LESS PO) Take by mouth      lisinopril (PRINIVIL;ZESTRIL) 20 MG tablet Take 1 tablet by mouth daily 30 tablet 3    Cholecalciferol (VITAMIN D3) 1.25 MG (40836 UT) CAPS Take by mouth once a week      Calcium Carbonate-Vitamin D (CALCIUM + D PO) Take 1 tablet by mouth daily.  VITAMIN E PO Take 1 capsule by mouth daily. Dose unknown       No current facility-administered medications for this visit. No Known Allergies        ROS:   Review of Systems   Constitutional: Negative for fatigue and fever. HENT: Negative for congestion, nosebleeds and sinus pressure.     Eyes: Negative for redness and visual disturbance. Respiratory: Negative for cough, chest tightness, shortness of breath and wheezing. Cardiovascular: Negative for chest pain, palpitations and leg swelling. Gastrointestinal: Negative for abdominal distention, abdominal pain, diarrhea and nausea. Endocrine: Negative for cold intolerance, heat intolerance, polydipsia and polyphagia. Genitourinary: Negative for difficulty urinating, frequency and urgency. Musculoskeletal: Negative for arthralgias, joint swelling and myalgias. Skin: Negative for color change and wound. Neurological: Negative for dizziness, syncope, weakness and numbness. Psychiatric/Behavioral: Negative for agitation, behavioral problems, confusion, decreased concentration, hallucinations and suicidal ideas. The patient is not nervous/anxious. PHYSICAL EXAM:  Vitals:    03/17/21 0909   BP: 126/78   Resp: 18   Weight: 201 lb (91.2 kg)   Height: 5' 4\" (1.626 m)     Physical Exam  Vitals signs reviewed. Constitutional:       Appearance: Normal appearance. HENT:      Head: Normocephalic. Mouth/Throat:      Mouth: Mucous membranes are moist.      Pharynx: Oropharynx is clear. Eyes:      Conjunctiva/sclera: Conjunctivae normal.   Neck:      Musculoskeletal: Normal range of motion and neck supple. Vascular: No carotid bruit. Cardiovascular:      Rate and Rhythm: Normal rate and regular rhythm. Pulses: Normal pulses. Heart sounds: Normal heart sounds. Pulmonary:      Effort: Pulmonary effort is normal.      Breath sounds: Normal breath sounds. No rales. Chest:      Chest wall: No tenderness. Abdominal:      General: Bowel sounds are normal.      Palpations: Abdomen is soft. Musculoskeletal: Normal range of motion. Skin:     General: Skin is warm. Neurological:      General: No focal deficit present. Mental Status: She is alert and oriented to person, place, and time.    Psychiatric:         Mood and Affect: Mood normal.         Behavior: Behavior normal.         Thought Content: Thought content normal.          Pertinent Labs:  CBC:   WBC (E9/L)   Date Value   01/27/2021 8.3   01/25/2021 6.9   04/16/2020 7.5     Hemoglobin (g/dL)   Date Value   01/27/2021 11.2 (L)   01/25/2021 13.1   04/16/2020 13.0     Hematocrit (%)   Date Value   01/27/2021 34.8   01/25/2021 42.4   04/16/2020 40.1     Platelets (Q7/P)   Date Value   01/27/2021 199   01/25/2021 312   04/16/2020 264      BMP:   Sodium (mmol/L)   Date Value   01/27/2021 142   01/25/2021 140   04/16/2020 138     Potassium (mmol/L)   Date Value   01/27/2021 4.2   01/25/2021 4.1   04/16/2020 4.3     Magnesium (mg/dL)   Date Value   01/25/2021 2.0   04/16/2020 2.0     Chloride (mmol/L)   Date Value   01/27/2021 105   01/25/2021 102   04/16/2020 101     CO2 (mmol/L)   Date Value   01/27/2021 25   01/25/2021 29   04/16/2020 26     BUN (mg/dL)   Date Value   01/27/2021 19   01/25/2021 20   04/16/2020 16     CREATININE (mg/dL)   Date Value   01/27/2021 1.0   01/25/2021 1.1 (H)   04/16/2020 0.9     Glucose (mg/dL)   Date Value   01/27/2021 105 (H)   01/25/2021 97   04/16/2020 111 (H)     Calcium (mg/dL)   Date Value   01/27/2021 8.8   01/25/2021 9.5   04/16/2020 9.3      INR:   INR (no units)   Date Value   01/25/2021 0.9      BNP: No results found for: BNP   TSH:   TSH (uIU/mL)   Date Value   01/25/2021 2.550      Cardiac Injury Profile:   Troponin (ng/mL)   Date Value   04/17/2020 <0.01     Hemoglobin A1C:   Hemoglobin A1C (%)   Date Value   03/08/2019 5.6           Pertinent Cardiac Testing:     TTE: 4/2020:   Summary   Left ventricle is normal in size . Mild left ventricular concentric hypertrophy noted. No regional wall motion abnormalities seen. Normal left ventricular ejection fraction. Mild mitral regurgitation is present. TTE: 1/26/21:  Summary   Limited echo ordered for CP and dyspnea post PPM.      Normal left ventricular size and systolic function.    Ejection fraction is visually estimated at 65-70%. Normal right ventricular size and function. Device lead visualized in right ventricle. Trivial pericardial effusion noted adjacent to inferolateral wall. No   definite evidence of hemodynamic compromise. CT chest: 1/26/2021  1.  No pneumonia or acute cardiopulmonary disease identified. 2.  Left subclavian dual chamber transvenous pacemaker. , As visualized, the   electrodes are intact and in appropriate position. 3.  Posterior bibasilar mild pleural scarring, worse on the right. Additional right basilar minor atelectasis or scarring. 4.  Right upper lobe 8 mm pleural based nodule or fibronodular scar.  Per   Fleischner criteria, suggest follow-up chest CT for re-evaluation in 6-12   months. 5.  Pectus excavatum deformity and nonvisualization of left kidney. Device Interrogation: 3/17/21   Underlying rhythm: AsVs  Mode: DDD   Battery Voltage/Longevity:  15.3    Pacing: A: 0.5%  RV: <0.1%    P wave: 4.4 mV  Impedance: 361 ohms   Threshold: 0.5 V @ 0.4 ms  RV R wave: >20 mV  Impedance: 494 ohms   Threshold: 0.75 V @ 0.4 ms  Episodes: 3 SVT episodes on 3/8/2021, longest 5 minutes and 30 seconds, 1:1, rate: 154 bpm  Reprogramming included: see below  Overall device function is normal    All device programmable settings were evaluated per above and in the scanned document, along with iterative adjustments (capture thresholds) to assess and select the most appropriate final programming to provide for consistent delivery of the appropriate therapy and to verify function of the device. I have independently reviewed all of the ECGs and rhythm strips per above    I have personally reviewed the laboratory, cardiac diagnostic and radiographic testing as outlined above: We have requested previous records. 1. Cardiac pacemaker in situ    2. Abnormal EKG    3. Syncope, unspecified syncope type    4.  Sinus node dysfunction (HCC)

## 2021-03-26 ENCOUNTER — NURSE ONLY (OUTPATIENT)
Dept: NON INVASIVE DIAGNOSTICS | Age: 66
End: 2021-03-26

## 2021-04-08 ENCOUNTER — NURSE ONLY (OUTPATIENT)
Dept: NON INVASIVE DIAGNOSTICS | Age: 66
End: 2021-04-08
Payer: MEDICARE

## 2021-04-08 DIAGNOSIS — R55 SYNCOPE, UNSPECIFIED SYNCOPE TYPE: ICD-10-CM

## 2021-04-08 DIAGNOSIS — Z95.0 CARDIAC PACEMAKER IN SITU: Primary | ICD-10-CM

## 2021-04-08 DIAGNOSIS — I49.5 SINUS NODE DYSFUNCTION (HCC): ICD-10-CM

## 2021-04-08 PROCEDURE — 93280 PM DEVICE PROGR EVAL DUAL: CPT | Performed by: INTERNAL MEDICINE

## 2021-04-09 ENCOUNTER — TELEPHONE (OUTPATIENT)
Dept: NON INVASIVE DIAGNOSTICS | Age: 66
End: 2021-04-09

## 2021-04-09 NOTE — TELEPHONE ENCOUNTER
----- Message from Meliton Chavez sent at 4/9/2021  8:06 AM EDT -----    ----- Message -----  From: Andrea Nuñez MD  Sent: 4/8/2021  10:48 AM EDT  To: Promise Tyson    Looks much better.  F/u with me in one month  ----- Message -----  From: Nohemy Ontiveros In [facility]  Sent: 4/8/2021  10:23 AM EDT  To: Andrea Nuñez MD

## 2021-04-13 ENCOUNTER — NURSE ONLY (OUTPATIENT)
Dept: NON INVASIVE DIAGNOSTICS | Age: 66
End: 2021-04-13
Payer: MEDICARE

## 2021-04-13 DIAGNOSIS — Z95.0 CARDIAC PACEMAKER IN SITU: Primary | ICD-10-CM

## 2021-04-13 DIAGNOSIS — I49.5 SINUS NODE DYSFUNCTION (HCC): ICD-10-CM

## 2021-04-22 PROCEDURE — 93294 REM INTERROG EVL PM/LDLS PM: CPT | Performed by: INTERNAL MEDICINE

## 2021-04-22 PROCEDURE — 93296 REM INTERROG EVL PM/IDS: CPT | Performed by: INTERNAL MEDICINE

## 2021-05-12 ENCOUNTER — OFFICE VISIT (OUTPATIENT)
Dept: NON INVASIVE DIAGNOSTICS | Age: 66
End: 2021-05-12
Payer: MEDICARE

## 2021-05-12 VITALS
RESPIRATION RATE: 18 BRPM | HEIGHT: 64 IN | SYSTOLIC BLOOD PRESSURE: 138 MMHG | DIASTOLIC BLOOD PRESSURE: 82 MMHG | WEIGHT: 199 LBS | BODY MASS INDEX: 33.97 KG/M2

## 2021-05-12 DIAGNOSIS — Z95.0 CARDIAC PACEMAKER IN SITU: ICD-10-CM

## 2021-05-12 DIAGNOSIS — I49.5 SINUS NODE DYSFUNCTION (HCC): ICD-10-CM

## 2021-05-12 DIAGNOSIS — I49.5 SSS (SICK SINUS SYNDROME) (HCC): Primary | ICD-10-CM

## 2021-05-12 PROCEDURE — 99214 OFFICE O/P EST MOD 30 MIN: CPT | Performed by: INTERNAL MEDICINE

## 2021-05-12 PROCEDURE — 1090F PRES/ABSN URINE INCON ASSESS: CPT | Performed by: INTERNAL MEDICINE

## 2021-05-12 PROCEDURE — 1123F ACP DISCUSS/DSCN MKR DOCD: CPT | Performed by: INTERNAL MEDICINE

## 2021-05-12 PROCEDURE — 3017F COLORECTAL CA SCREEN DOC REV: CPT | Performed by: INTERNAL MEDICINE

## 2021-05-12 PROCEDURE — G8400 PT W/DXA NO RESULTS DOC: HCPCS | Performed by: INTERNAL MEDICINE

## 2021-05-12 PROCEDURE — 1036F TOBACCO NON-USER: CPT | Performed by: INTERNAL MEDICINE

## 2021-05-12 PROCEDURE — G8428 CUR MEDS NOT DOCUMENT: HCPCS | Performed by: INTERNAL MEDICINE

## 2021-05-12 PROCEDURE — 4040F PNEUMOC VAC/ADMIN/RCVD: CPT | Performed by: INTERNAL MEDICINE

## 2021-05-12 PROCEDURE — 93280 PM DEVICE PROGR EVAL DUAL: CPT | Performed by: INTERNAL MEDICINE

## 2021-05-12 PROCEDURE — G8417 CALC BMI ABV UP PARAM F/U: HCPCS | Performed by: INTERNAL MEDICINE

## 2021-05-12 ASSESSMENT — ENCOUNTER SYMPTOMS
SHORTNESS OF BREATH: 0
EYE REDNESS: 0
DIARRHEA: 0
COUGH: 0
ABDOMINAL DISTENTION: 0
COLOR CHANGE: 0
ABDOMINAL PAIN: 0
CHEST TIGHTNESS: 0
WHEEZING: 0
SINUS PRESSURE: 0
NAUSEA: 0

## 2021-05-12 NOTE — PROGRESS NOTES
Cardiac Electrophysiology Outpatient Progress Note    Bridget Watson  1955  Date of Service: 5/12/2021  Referring Provider/PCP: Aspen Storm MD  Chief Complaint:   Chief Complaint   Patient presents with    Other     1 mo OV MDT no AAD - pt states there is alot of tenderness around the device site         Lacey Motta presents to the office today for the management of these Electrophysiology conditions: syncope/pacemaker    Patient with a history of recurrent syncope and HTN. A 30 day monitor that on 01/24/20 showed a sinus pause of 7 sec during waking hours that correlated with syncopal episode. She underwent dual chamber pacemaker placement 1/25/21. Since her last office visit she reports no further syncope. Her device site looks healed and free from infection or erosion. TThe device area in general feels sensitive and she does not like to lie on it but incision is healed. No draining or redness. Currently denies any angina, syncope, dyspnea on exertion, paroxysmal nocturnal dyspnea and palpitations. The patient continues to be followed remotely.         Patient Active Problem List    Diagnosis Date Noted    Other hyperlipidemia 04/17/2020       Family History   Problem Relation Age of Onset    Heart Disease Mother        SOCIAL HISTORY   Social History     Socioeconomic History    Marital status:      Spouse name: Not on file    Number of children: Not on file    Years of education: Not on file    Highest education level: Not on file   Occupational History    Not on file   Social Needs    Financial resource strain: Not on file    Food insecurity     Worry: Not on file     Inability: Not on file    Transportation needs     Medical: Not on file     Non-medical: Not on file   Tobacco Use    Smoking status: Never Smoker    Smokeless tobacco: Never Used   Substance and Sexual Activity    Alcohol use: No    Drug use: Never    Sexual activity: swelling. Gastrointestinal: Negative for abdominal distention, abdominal pain, diarrhea and nausea. Endocrine: Negative for cold intolerance, heat intolerance, polydipsia and polyphagia. Genitourinary: Negative for difficulty urinating, frequency and urgency. Musculoskeletal: Negative for arthralgias, joint swelling and myalgias. Skin: Negative for color change and wound. Neurological: Negative for dizziness, syncope, weakness and numbness. Psychiatric/Behavioral: Negative for agitation, behavioral problems, confusion, decreased concentration, hallucinations and suicidal ideas. The patient is not nervous/anxious. PHYSICAL EXAM:  Vitals:    05/12/21 0852   BP: 138/82   Resp: 18   Weight: 199 lb (90.3 kg)   Height: 5' 4\" (1.626 m)     Physical Exam  Vitals signs reviewed. Constitutional:       Appearance: Normal appearance. HENT:      Head: Normocephalic. Mouth/Throat:      Mouth: Mucous membranes are moist.      Pharynx: Oropharynx is clear. Eyes:      Conjunctiva/sclera: Conjunctivae normal.   Neck:      Musculoskeletal: Normal range of motion and neck supple. Vascular: No carotid bruit. Cardiovascular:      Rate and Rhythm: Normal rate and regular rhythm. Pulses: Normal pulses. Heart sounds: Normal heart sounds. Pulmonary:      Effort: Pulmonary effort is normal.      Breath sounds: Normal breath sounds. No rales. Chest:      Chest wall: No tenderness. Abdominal:      General: Bowel sounds are normal.      Palpations: Abdomen is soft. Musculoskeletal: Normal range of motion. Skin:     General: Skin is warm. Neurological:      General: No focal deficit present. Mental Status: She is alert and oriented to person, place, and time. Psychiatric:         Mood and Affect: Mood normal.         Behavior: Behavior normal.         Thought Content:  Thought content normal.          Pertinent Labs:  CBC:   WBC (E9/L)   Date Value   01/27/2021 8.3 Stable    3. Hyperlipidemia  - On Zocor    Plan  1. 6 month office follow up  2. Remotes q 91 days. I have spent a total of 25 minutes with the patient and his/her family reviewing the above stated recommendations. A total of >50% of that time involved face-to-face time providing counseling and or coordination of care with the other providers. Thank you for allowing me to participate in your patient's care.       Edil Ray MD  Cardiac Electrophysiology  [address] [place] [address]

## 2021-08-24 ENCOUNTER — NURSE ONLY (OUTPATIENT)
Dept: NON INVASIVE DIAGNOSTICS | Age: 66
End: 2021-08-24

## 2021-08-24 NOTE — PROGRESS NOTES
Incision appears good and well healed. Maybe a lot of sensitivity around the site. I don't see any hematoma or swelling. Sometimes, it takes time to get better.  Have her follow up with me in office

## 2021-08-24 NOTE — PROGRESS NOTES
Patient here for wound check. States she is still having pain when anything touches her pacemaker site (implant 1/25/21) Incision well approximated, not warm to touch, but is sensitive when I placed my hand over incision. Did pacemaker interrogation: normal device function. Dr. Garry Hebert did come in to examine incision. Will forward to Dr. Bharat Morgan.     Allison Padilla RN, BSN  [facility]

## 2021-08-25 ENCOUNTER — TELEPHONE (OUTPATIENT)
Dept: NON INVASIVE DIAGNOSTICS | Age: 66
End: 2021-08-25

## 2021-08-25 NOTE — TELEPHONE ENCOUNTER
----- Message from Maria G Hughes MD sent at 8/24/2021  4:37 PM EDT -----      ----- Message -----  From: Ngozi Fairchild RN  Sent: 8/24/2021   2:25 PM EDT  To: Maria G Hughes MD    Patient complaining of pacemaker incisional pain. Device implanted 1/25/21. Dr. Nataliya Vasquez did look at incision. Will upload a picture in media.

## 2021-08-25 NOTE — TELEPHONE ENCOUNTER
Trevon Holt MD (Physician) [facility] Electrophysiology     Incision appears good and well healed. Maybe a lot of sensitivity around the site. I don't see any hematoma or swelling. Sometimes, it takes time to get better. Have her follow up with me in office        I spoke to Bib Sanchez and let her know that  reviewed the photo of her incision and states the above. I offered a sooner OV w/ [address], but she will be in [place] in September. I asked her to keep an eye on her incision and if any changes to call. I will get her an appt in Oct. She verbalized understanding.

## 2021-10-05 ENCOUNTER — OFFICE VISIT (OUTPATIENT)
Dept: NON INVASIVE DIAGNOSTICS | Age: 66
End: 2021-10-05
Payer: MEDICARE

## 2021-10-05 VITALS
SYSTOLIC BLOOD PRESSURE: 126 MMHG | DIASTOLIC BLOOD PRESSURE: 82 MMHG | HEART RATE: 75 BPM | WEIGHT: 200 LBS | BODY MASS INDEX: 35.44 KG/M2 | RESPIRATION RATE: 18 BRPM | HEIGHT: 63 IN

## 2021-10-05 DIAGNOSIS — Z95.0 PACEMAKER: ICD-10-CM

## 2021-10-05 DIAGNOSIS — M79.2 NERVE PAIN: Primary | ICD-10-CM

## 2021-10-05 DIAGNOSIS — I49.5 SINUS NODE DYSFUNCTION (HCC): ICD-10-CM

## 2021-10-05 DIAGNOSIS — R94.31 ABNORMAL EKG: ICD-10-CM

## 2021-10-05 DIAGNOSIS — R52 PAIN: ICD-10-CM

## 2021-10-05 PROCEDURE — 1036F TOBACCO NON-USER: CPT | Performed by: INTERNAL MEDICINE

## 2021-10-05 PROCEDURE — 1123F ACP DISCUSS/DSCN MKR DOCD: CPT | Performed by: INTERNAL MEDICINE

## 2021-10-05 PROCEDURE — G8400 PT W/DXA NO RESULTS DOC: HCPCS | Performed by: INTERNAL MEDICINE

## 2021-10-05 PROCEDURE — G8484 FLU IMMUNIZE NO ADMIN: HCPCS | Performed by: INTERNAL MEDICINE

## 2021-10-05 PROCEDURE — 1090F PRES/ABSN URINE INCON ASSESS: CPT | Performed by: INTERNAL MEDICINE

## 2021-10-05 PROCEDURE — 3017F COLORECTAL CA SCREEN DOC REV: CPT | Performed by: INTERNAL MEDICINE

## 2021-10-05 PROCEDURE — 99214 OFFICE O/P EST MOD 30 MIN: CPT | Performed by: INTERNAL MEDICINE

## 2021-10-05 PROCEDURE — G8417 CALC BMI ABV UP PARAM F/U: HCPCS | Performed by: INTERNAL MEDICINE

## 2021-10-05 PROCEDURE — 4040F PNEUMOC VAC/ADMIN/RCVD: CPT | Performed by: INTERNAL MEDICINE

## 2021-10-05 PROCEDURE — G8427 DOCREV CUR MEDS BY ELIG CLIN: HCPCS | Performed by: INTERNAL MEDICINE

## 2021-10-05 RX ORDER — GABAPENTIN 100 MG/1
100 CAPSULE ORAL 2 TIMES DAILY
Qty: 180 CAPSULE | Refills: 1 | Status: SHIPPED
Start: 2021-10-05 | End: 2022-06-28

## 2021-10-05 ASSESSMENT — ENCOUNTER SYMPTOMS
ABDOMINAL PAIN: 0
COLOR CHANGE: 0
WHEEZING: 0
NAUSEA: 0
COUGH: 0
EYE REDNESS: 0
CHEST TIGHTNESS: 0
ABDOMINAL DISTENTION: 0
SINUS PRESSURE: 0
DIARRHEA: 0
SHORTNESS OF BREATH: 0

## 2021-10-05 NOTE — PROGRESS NOTES
Cardiac Electrophysiology Outpatient Progress Note    Natalie Randle  1955  Date of Service: 10/5/2021  Referring Provider/PCP: Rita Rivera MD  Chief Complaint:   Chief Complaint   Patient presents with    Irregular Heart Beat     6 month f/u MDT-  Patient complains of pain over device site         Lacey Motta presents to the office today for the management of these Electrophysiology conditions: syncope/pacemaker    Patient with a history of recurrent syncope and HTN. A 30 day monitor that on 01/24/21 showed a sinus pause of 7 sec during waking hours that correlated with syncopal episode. She underwent dual chamber pacemaker placement 1/25/21.    5/12/21: Since her last office visit she reports no further syncope. Her device site looks healed and free from infection or erosion. The device area in general feels sensitive and she does not like to lie on it but incision is healed. No draining or redness. Currently denies any angina, syncope, dyspnea on exertion, paroxysmal nocturnal dyspnea and palpitations. The patient continues to be followed remotely. 10/5/21: She presents today for follow up. She continue to report \"painful sensations\" around her device area. Her device site looks well healed and free from infection or erosion. She offers no complaints from a device POV. Currently denies any angina, syncope, dyspnea on exertion, paroxysmal nocturnal dyspnea and palpitations. The patient continues to be followed remotely.         Patient Active Problem List    Diagnosis Date Noted    Other hyperlipidemia 04/17/2020       Family History   Problem Relation Age of Onset    Heart Disease Mother        SOCIAL HISTORY   Social History     Socioeconomic History    Marital status:      Spouse name: Not on file    Number of children: Not on file    Years of education: Not on file    Highest education level: Not on file   Occupational History    Not on file   Tobacco Use    Smoking status: Former Smoker     Packs/day: 0.50     Years: 10.00     Pack years: 5.00     Quit date: 10/5/1996     Years since quittin.0    Smokeless tobacco: Never Used   Vaping Use    Vaping Use: Never used   Substance and Sexual Activity    Alcohol use: No    Drug use: Never    Sexual activity: Not on file   Other Topics Concern    Not on file   Social History Narrative    Not on file     Social Determinants of Health     Financial Resource Strain:     Difficulty of Paying Living Expenses:    Food Insecurity:     Worried About Running Out of Food in the Last Year:     [address] in the Last Year:    Transportation Needs:     Lack of Transportation (Medical):  Lack of Transportation (Non-Medical):    Physical Activity:     Days of Exercise per Week:     Minutes of Exercise per Session:    Stress:     Feeling of Stress :    Social Connections:     Frequency of Communication with Friends and Family:     Frequency of Social Gatherings with Friends and Family:     Attends [employer] Services:     Active Member of Clubs or Organizations:     Attends Club or Organization Meetings:     Marital Status:    Intimate Partner Violence:     Fear of Current or Ex-Partner:     Emotionally Abused:     Physically Abused:     Sexually Abused:          Past Surgical History:   Procedure Laterality Date    CHOLECYSTECTOMY      KIDNEY SURGERY      Kidney donation to her sister.  PACEMAKER INSERTION Left 2021    DUAL PPM   ([employer]TR[employer])   DR. GRAHAM       Current Outpatient Medications   Medication Sig Dispense Refill    gabapentin (NEURONTIN) 100 MG capsule Take 1 capsule by mouth 2 times daily for 180 days.  Intended supply: 90 days 180 capsule 1    simvastatin (ZOCOR) 20 MG tablet Take 20 mg by mouth nightly       amLODIPine (NORVASC) 2.5 MG tablet 2.5 mg 2 times daily       Glucosamine 500 MG CAPS Take 500 mg by mouth daily      lisinopril (PRINIVIL;ZESTRIL) 20 MG tablet Take 1 tablet by mouth daily 30 tablet 3    Cholecalciferol (VITAMIN D3) 1.25 MG (68849 UT) CAPS Take by mouth once a week      Calcium Carbonate-Vitamin D (CALCIUM + D PO) Take 1 tablet by mouth daily.  VITAMIN E PO Take 1 capsule by mouth daily. Dose unknown       No current facility-administered medications for this visit. No Known Allergies        ROS:   Review of Systems   Constitutional: Negative for fatigue and fever. HENT: Negative for congestion, nosebleeds and sinus pressure. Eyes: Negative for redness and visual disturbance. Respiratory: Negative for cough, chest tightness, shortness of breath and wheezing. Cardiovascular: Negative for chest pain, palpitations and leg swelling. Gastrointestinal: Negative for abdominal distention, abdominal pain, diarrhea and nausea. Endocrine: Negative for cold intolerance, heat intolerance, polydipsia and polyphagia. Genitourinary: Negative for difficulty urinating, frequency and urgency. Musculoskeletal: Negative for arthralgias, joint swelling and myalgias. Skin: Negative for color change and wound. Neurological: Negative for dizziness, syncope, weakness and numbness. Psychiatric/Behavioral: Negative for agitation, behavioral problems, confusion, decreased concentration, hallucinations and suicidal ideas. The patient is not nervous/anxious. PHYSICAL EXAM:  Vitals:    10/05/21 1347   BP: 126/82   Pulse: 75   Resp: 18   Weight: 200 lb (90.7 kg)   Height: 5' 3\" (1.6 m)     Physical Exam  Vitals reviewed. Constitutional:       Appearance: Normal appearance. HENT:      Head: Normocephalic. Mouth/Throat:      Mouth: Mucous membranes are moist.      Pharynx: Oropharynx is clear. Eyes:      Conjunctiva/sclera: Conjunctivae normal.   Neck:      Vascular: No carotid bruit. Cardiovascular:      Rate and Rhythm: Normal rate and regular rhythm. Pulses: Normal pulses.       Heart sounds: Normal heart sounds. Comments: No swelling or hematoma, incision is well healed. Mild tenderness on palpation around chest  Pulmonary:      Effort: Pulmonary effort is normal.      Breath sounds: Normal breath sounds. No rales. Chest:      Chest wall: No tenderness. Abdominal:      General: Bowel sounds are normal.      Palpations: Abdomen is soft. Musculoskeletal:         General: Normal range of motion. Cervical back: Normal range of motion and neck supple. Skin:     General: Skin is warm. Neurological:      General: No focal deficit present. Mental Status: She is alert and oriented to person, place, and time. Psychiatric:         Mood and Affect: Mood normal.         Behavior: Behavior normal.         Thought Content:  Thought content normal.          Pertinent Labs:  CBC:   WBC (E9/L)   Date Value   01/27/2021 8.3   01/25/2021 6.9   04/16/2020 7.5     Hemoglobin (g/dL)   Date Value   01/27/2021 11.2 (L)   01/25/2021 13.1   04/16/2020 13.0     Hematocrit (%)   Date Value   01/27/2021 34.8   01/25/2021 42.4   04/16/2020 40.1     Platelets (S8/Y)   Date Value   01/27/2021 199   01/25/2021 312   04/16/2020 264      BMP:   Sodium (mmol/L)   Date Value   01/27/2021 142   01/25/2021 140   04/16/2020 138     Potassium (mmol/L)   Date Value   01/27/2021 4.2   01/25/2021 4.1   04/16/2020 4.3     Magnesium (mg/dL)   Date Value   01/25/2021 2.0   04/16/2020 2.0     Chloride (mmol/L)   Date Value   01/27/2021 105   01/25/2021 102   04/16/2020 101     CO2 (mmol/L)   Date Value   01/27/2021 25   01/25/2021 29   04/16/2020 26     BUN (mg/dL)   Date Value   01/27/2021 19   01/25/2021 20   04/16/2020 16     CREATININE (mg/dL)   Date Value   01/27/2021 1.0   01/25/2021 1.1 (H)   04/16/2020 0.9     Glucose (mg/dL)   Date Value   01/27/2021 105 (H)   01/25/2021 97   04/16/2020 111 (H)     Calcium (mg/dL)   Date Value   01/27/2021 8.8   01/25/2021 9.5   04/16/2020 9.3      INR:   INR (no units) Date Value   01/25/2021 0.9      BNP: No results found for: BNP   TSH:   TSH (uIU/mL)   Date Value   01/25/2021 2.550      Cardiac Injury Profile:   Troponin (ng/mL)   Date Value   04/17/2020 <0.01     Hemoglobin A1C:   Hemoglobin A1C (%)   Date Value   03/08/2019 5.6           Pertinent Cardiac Testing:     TTE: 4/2020:   Summary   Left ventricle is normal in size . Mild left ventricular concentric hypertrophy noted. No regional wall motion abnormalities seen. Normal left ventricular ejection fraction. Mild mitral regurgitation is present. TTE: 1/26/21:  Summary   Limited echo ordered for CP and dyspnea post PPM.      Normal left ventricular size and systolic function. Ejection fraction is visually estimated at 65-70%. Normal right ventricular size and function. Device lead visualized in right ventricle. Trivial pericardial effusion noted adjacent to inferolateral wall. No   definite evidence of hemodynamic compromise. TTE: 1/2021   Summary   Limited echo ordered for CP and dyspnea post PPM.    Normal left ventricular size and systolic function. Ejection fraction is visually estimated at 65-70%. Normal right ventricular size and function. Device lead visualized in right ventricle. Trivial pericardial effusion noted adjacent to inferolateral wall. No   definite evidence of hemodynamic compromise    CT chest: 1/26/2021  1.  No pneumonia or acute cardiopulmonary disease identified. 2.  Left subclavian dual chamber transvenous pacemaker. , As visualized, the   electrodes are intact and in appropriate position. 3.  Posterior bibasilar mild pleural scarring, worse on the right. Additional right basilar minor atelectasis or scarring. 4.  Right upper lobe 8 mm pleural based nodule or fibronodular scar.  Per   Fleischner criteria, suggest follow-up chest CT for re-evaluation in 6-12   months. 5.  Pectus excavatum deformity and nonvisualization of left kidney.        Device Interrogation: 10/5/21   Underlying rhythm: AsVs  Mode: DDD   Battery Voltage/Longevity:  14.7 years    Pacing: A: 0.2%  RV: <0.1%    P wave: 3.5 mV  Impedance: 361 ohms   Threshold: 0.5 V @ 0.4 ms  RV R wave: >20 mV  Impedance: 380 ohms   Threshold: 1.0 V @ 0.4 ms  Episodes: 1 SVT on 9/14/2021, lasting 41 seconds  Reprogramming included: see below  Overall device function is normal    All device programmable settings were evaluated per above and in the scanned document, along with iterative adjustments (capture thresholds) to assess and select the most appropriate final programming to provide for consistent delivery of the appropriate therapy and to verify function of the device. I have independently reviewed all of the ECGs and rhythm strips per above    I have personally reviewed the laboratory, cardiac diagnostic and radiographic testing as outlined above: We have requested previous records. 1. Nerve pain    2. Pain    3. Sinus node dysfunction (HCC)    4. Abnormal EKG    5. Pacemaker         ASSESSMENT & PLAN     1. Sinus Node Dysfunction   - S/P Medtronic dual chamber pacemaker (implant: 1/25/21)  - Device check overall normal.  - Site: Well healed. Does still feel sensitive around the left device site in general    2. Hypertension  - Stable    3. Hyperlipidemia  - On Zocor    4. Pacemaker site sensitivity  - still feels sensitive and pain around pacemaker site  - No sign of fluctuance, infection, wound disruption, site looks pristine  - check CBC, CMP, ESR, CRP. I do not suspect infection  - ? Neuropathic pain   - Start gabapentin 100 mg bid    5. SVT  - Asymptomatic    Plan  1. 6 month office follow up. 2. Remotes q 91 days. 3. Start on low dose Neurontin 100mg BID to help with nerve pain around her device site. I have spent a total of 25 minutes with the patient and his/her family reviewing the above stated recommendations.  A total of >50% of that time involved face-to-face time providing counseling and or coordination of care with the other providers. Thank you for allowing me to participate in your patient's care.       Charanjit Calloway MD  Cardiac Electrophysiology  [address] [place] [address]

## 2021-10-06 ENCOUNTER — TELEPHONE (OUTPATIENT)
Dept: NON INVASIVE DIAGNOSTICS | Age: 66
End: 2021-10-06

## 2021-10-06 DIAGNOSIS — R55 SYNCOPE, UNSPECIFIED SYNCOPE TYPE: ICD-10-CM

## 2021-10-06 DIAGNOSIS — M79.2 NERVE PAIN: Primary | ICD-10-CM

## 2021-10-06 NOTE — TELEPHONE ENCOUNTER
----- Message from Sin Gleason MD sent at 10/5/2021  2:37 PM EDT -----  Avoid driving when using the gabapentin due to risk of drowsiness.  Can take it at night

## 2021-10-08 LAB
ALBUMIN SERPL-MCNC: NORMAL G/DL
ALP BLD-CCNC: NORMAL U/L
ALT SERPL-CCNC: NORMAL U/L
ANION GAP SERPL CALCULATED.3IONS-SCNC: NORMAL MMOL/L
AST SERPL-CCNC: NORMAL U/L
BASOPHILS ABSOLUTE: NORMAL
BASOPHILS RELATIVE PERCENT: NORMAL
BILIRUB SERPL-MCNC: NORMAL MG/DL
BUN BLDV-MCNC: NORMAL MG/DL
C-REACTIVE PROTEIN: 0.18
CALCIUM SERPL-MCNC: NORMAL MG/DL
CHLORIDE BLD-SCNC: NORMAL MMOL/L
CO2: NORMAL
CREAT SERPL-MCNC: NORMAL MG/DL
EOSINOPHILS ABSOLUTE: NORMAL
EOSINOPHILS RELATIVE PERCENT: NORMAL
GFR CALCULATED: NORMAL
GLUCOSE BLD-MCNC: NORMAL MG/DL
HCT VFR BLD CALC: NORMAL %
HEMOGLOBIN: NORMAL
LYMPHOCYTES ABSOLUTE: NORMAL
LYMPHOCYTES RELATIVE PERCENT: NORMAL
MCH RBC QN AUTO: NORMAL PG
MCHC RBC AUTO-ENTMCNC: NORMAL G/DL
MCV RBC AUTO: NORMAL FL
MONOCYTES ABSOLUTE: NORMAL
MONOCYTES RELATIVE PERCENT: NORMAL
NEUTROPHILS ABSOLUTE: NORMAL
NEUTROPHILS RELATIVE PERCENT: NORMAL
PDW BLD-RTO: NORMAL %
PLATELET # BLD: NORMAL 10*3/UL
PMV BLD AUTO: NORMAL FL
POTASSIUM SERPL-SCNC: NORMAL MMOL/L
RBC # BLD: NORMAL 10*6/UL
SEDIMENTATION RATE, ERYTHROCYTE: 15
SODIUM BLD-SCNC: NORMAL MMOL/L
TOTAL PROTEIN: NORMAL
WBC # BLD: NORMAL 10*3/UL

## 2021-10-11 DIAGNOSIS — G47.30 SLEEP DISORDER BREATHING: ICD-10-CM

## 2021-10-11 DIAGNOSIS — R55 SYNCOPE, UNSPECIFIED SYNCOPE TYPE: ICD-10-CM

## 2021-10-11 DIAGNOSIS — M79.2 NERVE PAIN: ICD-10-CM

## 2021-12-23 ENCOUNTER — NURSE ONLY (OUTPATIENT)
Dept: NON INVASIVE DIAGNOSTICS | Age: 66
End: 2021-12-23
Payer: MEDICARE

## 2021-12-23 ENCOUNTER — TELEPHONE (OUTPATIENT)
Dept: NON INVASIVE DIAGNOSTICS | Age: 66
End: 2021-12-23

## 2021-12-23 DIAGNOSIS — I49.5 SINUS NODE DYSFUNCTION (HCC): Primary | ICD-10-CM

## 2021-12-23 DIAGNOSIS — Z95.0 CARDIAC PACEMAKER IN SITU: ICD-10-CM

## 2021-12-23 DIAGNOSIS — R94.31 ABNORMAL EKG: Primary | ICD-10-CM

## 2021-12-23 PROCEDURE — 93000 ELECTROCARDIOGRAM COMPLETE: CPT | Performed by: INTERNAL MEDICINE

## 2021-12-23 NOTE — TELEPHONE ENCOUNTER
Spoke with patient let her know results of EKG and recommendations. She verbalized understanding, stress ordered, faxed to dept to be scheduled.

## 2021-12-23 NOTE — PROGRESS NOTES
Patient was seen in the Office today to have EKG per Dr. Juan Pritchard. Pt tolerated EKG. Pt states she is having chest pain and SOB if walking up hill.    Electronically signed by Pradip Lozada MA on 12/23/2021 at 10:36 AM

## 2021-12-23 NOTE — TELEPHONE ENCOUNTER
----- Message from Анна Mott MD sent at 12/23/2021 10:43 AM EST -----  EKG looks OK. We can schedule nuclear stress test for her CP or follow up with Cardiology. If symptoms worsens, would recommend to go to ED.  Thanks.  ----- Message -----  From: Gibson Babinski, MA  Sent: 12/23/2021  10:36 AM EST  To: Анна Mott MD

## 2022-01-21 ENCOUNTER — TELEPHONE (OUTPATIENT)
Dept: CARDIOLOGY | Age: 67
End: 2022-01-21

## 2022-01-21 NOTE — TELEPHONE ENCOUNTER
Called to schedule treadmill test. Patient does not want to schedule at this time.   Electronically signed by Ruddy Santillan on 1/21/2022 at 11:28 AM

## 2022-04-01 ENCOUNTER — HOSPITAL ENCOUNTER (OUTPATIENT)
Dept: GENERAL RADIOLOGY | Age: 67
Discharge: HOME | End: 2022-04-03
Payer: MEDICARE

## 2022-04-01 ENCOUNTER — HOSPITAL ENCOUNTER (OUTPATIENT)
Age: 67
Discharge: HOME | End: 2022-04-03
Payer: MEDICARE

## 2022-04-01 DIAGNOSIS — Z01.818 PREOP EXAMINATION: ICD-10-CM

## 2022-04-01 PROCEDURE — 71046 X-RAY EXAM CHEST 2 VIEWS: CPT

## 2022-04-25 ENCOUNTER — TELEPHONE (OUTPATIENT)
Dept: NON INVASIVE DIAGNOSTICS | Age: 67
End: 2022-04-25

## 2022-04-25 NOTE — TELEPHONE ENCOUNTER
----- Message from Mauro Banuelos DO sent at 4/23/2022  2:14 PM EDT -----  Regarding: device  Please reduce detection to 140 bpm from 150 bpm to better capture SVT burden.    -Mauro Banuelos DO

## 2022-04-25 NOTE — TELEPHONE ENCOUNTER
Soren left message for patient to call the office to schedule device clinic. Patient called back. Patient is having knee surgery this week.  Mani Thompson scheduled device check for 5/10/2022 @10:00    1 [facility] and [address] [place] [address]

## 2022-05-10 NOTE — TELEPHONE ENCOUNTER
Patient showed up to her 10:00 appointment and was checked in at 9:59. Patient refused to wait and cancelled the appointment. If patient would have stayed wait time was < 5 minutes. Patient told  she will call back to reschedule when she feels up to it.

## 2022-05-13 ENCOUNTER — NURSE ONLY (OUTPATIENT)
Dept: NON INVASIVE DIAGNOSTICS | Age: 67
End: 2022-05-13

## 2022-05-13 NOTE — PROGRESS NOTES
Normal In-Office: No Events  1  Normal Device Function  Alerts or events: None  Battery: , 14.1 years  Sensing, impedance and thresholds reviewed and tested  Presenting Rhythm: was reviewed  Underlying Rhythm: was reviewed  Heart Rate Histograms reviewed  Pacing and Detection Parameters were evaluated  Additional Notes:  Presenting AsVs @ 90 Office check to adjust VT monitor interval from 150 bpm to 140bpm due to episodes of SVT on 4/26/22 remote [facility]. Plan: Patient requests Dr. Juan José Hahn manage her device as she knows him. Dr. Juan José Hahn is agreeable to this. Every 91 day remote [facility] followup.     Arie Denver RN, BSN  [facility]

## 2022-06-28 ENCOUNTER — NURSE ONLY (OUTPATIENT)
Dept: NON INVASIVE DIAGNOSTICS | Age: 67
End: 2022-06-28

## 2022-06-28 ENCOUNTER — OFFICE VISIT (OUTPATIENT)
Dept: CARDIOLOGY CLINIC | Age: 67
End: 2022-06-28
Payer: MEDICARE

## 2022-06-28 VITALS
DIASTOLIC BLOOD PRESSURE: 68 MMHG | WEIGHT: 197 LBS | HEART RATE: 94 BPM | HEIGHT: 64 IN | BODY MASS INDEX: 33.63 KG/M2 | RESPIRATION RATE: 12 BRPM | SYSTOLIC BLOOD PRESSURE: 118 MMHG

## 2022-06-28 DIAGNOSIS — I51.9 HEART PROBLEM: Primary | ICD-10-CM

## 2022-06-28 PROCEDURE — G8400 PT W/DXA NO RESULTS DOC: HCPCS | Performed by: INTERNAL MEDICINE

## 2022-06-28 PROCEDURE — 1036F TOBACCO NON-USER: CPT | Performed by: INTERNAL MEDICINE

## 2022-06-28 PROCEDURE — 1123F ACP DISCUSS/DSCN MKR DOCD: CPT | Performed by: INTERNAL MEDICINE

## 2022-06-28 PROCEDURE — G8427 DOCREV CUR MEDS BY ELIG CLIN: HCPCS | Performed by: INTERNAL MEDICINE

## 2022-06-28 PROCEDURE — 3017F COLORECTAL CA SCREEN DOC REV: CPT | Performed by: INTERNAL MEDICINE

## 2022-06-28 PROCEDURE — G8417 CALC BMI ABV UP PARAM F/U: HCPCS | Performed by: INTERNAL MEDICINE

## 2022-06-28 PROCEDURE — 1090F PRES/ABSN URINE INCON ASSESS: CPT | Performed by: INTERNAL MEDICINE

## 2022-06-28 PROCEDURE — 99204 OFFICE O/P NEW MOD 45 MIN: CPT | Performed by: INTERNAL MEDICINE

## 2022-06-28 PROCEDURE — 93000 ELECTROCARDIOGRAM COMPLETE: CPT | Performed by: INTERNAL MEDICINE

## 2022-06-28 RX ORDER — TRAMADOL HYDROCHLORIDE 50 MG/1
50 TABLET ORAL EVERY 6 HOURS PRN
COMMUNITY
Start: 2022-06-27

## 2022-06-28 RX ORDER — SIMVASTATIN 40 MG
40 TABLET ORAL NIGHTLY
COMMUNITY
Start: 2022-05-18

## 2022-06-28 NOTE — PROGRESS NOTES
CHIEF COMPLAINT: Pacer    HISTORY OF PRESENT ILLNESS: Patient is a 69 y.o. female seen at the request of Juve Madrid MD.      Presents to establish follow up. No CP or SOB. No palpitations. Past Medical History:   Diagnosis Date    Contusion of back     Displacement of lumbar intervertebral disc without myelopathy     Fracture 2011    Pelvis    Hyperlipidemia     Hypertension     IBS (irritable bowel syndrome)     L4-L5 disc bulge     Sinus node dysfunction (HCC) 2020    Vitamin D deficiency        Patient Active Problem List   Diagnosis    Other hyperlipidemia       No Known Allergies    Current Outpatient Medications   Medication Sig Dispense Refill    simvastatin (ZOCOR) 40 MG tablet Take 40 mg by mouth nightly       traMADol (ULTRAM) 50 MG tablet Take 50 mg by mouth every 6 hours as needed.  amLODIPine (NORVASC) 2.5 MG tablet 2.5 mg 2 times daily       Glucosamine 500 MG CAPS Take 500 mg by mouth daily      lisinopril (PRINIVIL;ZESTRIL) 20 MG tablet Take 1 tablet by mouth daily 30 tablet 3    Calcium Carbonate-Vitamin D (CALCIUM + D PO) Take 1 tablet by mouth daily.  VITAMIN E PO Take 1 capsule by mouth daily. Dose unknown       No current facility-administered medications for this visit.        Social History     Socioeconomic History    Marital status:      Spouse name: Not on file    Number of children: Not on file    Years of education: Not on file    Highest education level: Not on file   Occupational History    Not on file   Tobacco Use    Smoking status: Former Smoker     Packs/day: 0.50     Years: 10.00     Pack years: 5.00     Quit date: 10/5/1996     Years since quittin.7    Smokeless tobacco: Never Used   Vaping Use    Vaping Use: Never used   Substance and Sexual Activity    Alcohol use: No    Drug use: Never    Sexual activity: Not on file   Other Topics Concern    Not on file   Social History Narrative    Not on file Social Determinants of Health     Financial Resource Strain:     Difficulty of Paying Living Expenses: Not on file   Food Insecurity:     Worried About Running Out of Food in the Last Year: Not on file    Hardik of Food in the Last Year: Not on file   Transportation Needs:     Lack of Transportation (Medical): Not on file    Lack of Transportation (Non-Medical): Not on file   Physical Activity:     Days of Exercise per Week: Not on file    Minutes of Exercise per Session: Not on file   Stress:     Feeling of Stress : Not on file   Social Connections:     Frequency of Communication with Friends and Family: Not on file    Frequency of Social Gatherings with Friends and Family: Not on file    Attends Yazdanism Services: Not on file    Active Member of [address] [place] [address] [employer] Private or Organizations: Not on file    Attends Club or Organization Meetings: Not on file    Marital Status: Not on file   Intimate Partner Violence:     Fear of Current or Ex-Partner: Not on file    Emotionally Abused: Not on file    Physically Abused: Not on file    Sexually Abused: Not on file   Housing Stability:     Unable to Pay for Housing in the Last Year: Not on file    Number of [place] in the Last Year: Not on file    Unstable Housing in the Last Year: Not on file       Family History   Problem Relation Age of Onset    Heart Disease Mother      Review of Systems:  Heart: as above   Lungs: as above   Eyes: denies changes in vision or discharge. Ears: denies changes in hearing or pain. Nose: denies epistaxis or masses   Throat: denies sore throat or trouble swallowing. Neuro: denies numbness, tingling, tremors. Skin: denies rashes or itching. : denies hematuria, dysuria   GI: denies vomiting, diarrhea   Psych: denies mood changed, anxiety, depression. All other systems negative.     Physical Exam   /68   Pulse 94   Resp 12   Ht 5' 4\" (1.626 m)   Wt 197 lb (89.4 kg)   BMI 33.81 kg/m²   Constitutional: Oriented to person, place, and time. Well-developed and well-nourished. No distress. Head: Normocephalic and atraumatic. Eyes: EOM are normal. Pupils are equal, round, and reactive to light. Neck: Normal range of motion. Neck supple. No hepatojugular reflux and no JVD present. Carotid bruit is not present. No tracheal deviation present. No thyromegaly present. Cardiovascular: Normal rate, regular rhythm, normal heart sounds and intact distal pulses. Exam reveals no gallop and no friction rub. No murmur heard. Pulmonary/Chest: Effort normal and breath sounds normal. No respiratory distress. No wheezes. No rales. No tenderness. Abdominal: Soft. Bowel sounds are normal. No distension and no mass. No tenderness. No rebound and no guarding. Musculoskeletal: Normal range of motion. No edema and no tenderness. Lymphadenopathy:   No cervical adenopathy. No groin adenopathy. Neurological: Alert and oriented to person, place, and time. Skin: Skin is warm and dry. No rash noted. Not diaphoretic. No erythema. Psychiatric: Normal mood and affect. Behavior is normal.     EKG personally reviewed 06/28/22:  normal sinus rhythm, nonspecific ST and T waves changes. TTE: 4/2020:   Summary   Left ventricle is normal in size .   Mild left ventricular concentric hypertrophy noted.   No regional wall motion abnormalities seen.   Normal left ventricular ejection fraction.   Mild mitral regurgitation is present.     TTE: 1/26/21:  Summary   Limited echo ordered for CP and dyspnea post PPM.   Normal left ventricular size and systolic function.   Ejection fraction is visually estimated at 65-70%.   Normal right ventricular size and function.   Device lead visualized in right ventricle.   Trivial pericardial effusion noted adjacent to inferolateral wall.  No definite evidence of hemodynamic compromise.     TTE: 1/2021   Summary   Limited echo ordered for CP and dyspnea post PPM.    Normal left ventricular size and systolic function.   Ejection fraction is visually estimated at 65-70%.   Normal right ventricular size and function.   Device lead visualized in right ventricle.   Trivial pericardial effusion noted adjacent to inferolateral wall. No definite evidence of hemodynamic compromise     CT chest: 1/26/2021  1.  No pneumonia or acute cardiopulmonary disease identified. 2.  Left subclavian dual chamber transvenous pacemaker. , As visualized, the   electrodes are intact and in appropriate position. 3.  Posterior bibasilar mild pleural scarring, worse on the right. Additional right basilar minor atelectasis or scarring. 4.  Right upper lobe 8 mm pleural based nodule or fibronodular scar.  Per   Fleischner criteria, suggest follow-up chest CT for re-evaluation in 6-12   months. 5.  Pectus excavatum deformity and nonvisualization of left kidney.      ASSESSMENT AND PLAN:  Patient Active Problem List   Diagnosis    Other hyperlipidemia     1. Pacer:     Medtronic 1/25/21. Stable interrogation today. 2. SVT: Observe. 3. HTN: Observe.      4. Lipids: Statin.      Elizabeth Lopez D.O.   Cardiologist  Cardiology, [facility]

## 2022-06-28 NOTE — PROGRESS NOTES
Normal In-Office: With Events  1  Normal Device Function  Events or Alerts: 16  Battery: SEE PDF, 14.0 years  Sensing, impedance and thresholds reviewed and tested  Presenting Rhythm: was reviewed  Underlying Rhythm: was reviewed  Heart Rate Histograms reviewed  Pacing and Detection Parameters were evaluated  Additional Notes:  Presenting: AsVs @ 95 Patient's device is being managed by Dr. Shell Nieto (per patient request). discussed today's findings with Dr. Myron Almazan. Remote sent to Dr. Tennille Jordan, covering for Dr. Yahir Saleh By: Ra Shea 06/28/2022 16:22Edited By: Ra Shea 06/28/2022 16:24  Tachycardia: SVT  1  Stored EGMs are consistent with or suggestive of Supraventricular Tachycardia  AT burden: %  Total number of events: 16  Additional Notes:  Episodes of SVT with V rates 143. Most recent episdde 6/24/22. Patient states she was at a [employer]. Dr. Myron Almazan aware.     Ra Shea RN, BSN  [facility]

## 2022-07-06 NOTE — CARE COORDINATION
Transition of Care-attempted to meet with patient-off unit to Cath Lab for Pacer placement. Per chart review patient lives with her  Dillon Leos ([phone]), PCP is Dr. Zulay Jacob ([phone]) and uses [facility] [address] on [facility]. CM/SW following.   Jamal Rizzo BSN, RN  [facility]   [phone]
24

## 2023-03-20 ENCOUNTER — HOSPITAL ENCOUNTER (OUTPATIENT)
Dept: CT IMAGING | Age: 68
Discharge: HOME | End: 2023-03-22
Payer: MEDICARE

## 2023-03-20 DIAGNOSIS — R91.1 COIN LESION: ICD-10-CM

## 2023-03-20 PROCEDURE — 71250 CT THORAX DX C-: CPT

## 2023-04-28 ENCOUNTER — TELEPHONE (OUTPATIENT)
Dept: NON INVASIVE DIAGNOSTICS | Age: 68
End: 2023-04-28

## 2023-04-28 NOTE — TELEPHONE ENCOUNTER
Patient called wanting to know if she could have a breast ultrasound done when having a pacemaker. Explained that she could have an ultrasound.

## 2023-05-10 ENCOUNTER — HOSPITAL ENCOUNTER (OUTPATIENT)
Dept: GENERAL RADIOLOGY | Age: 68
Discharge: HOME | End: 2023-05-12
Payer: MEDICARE

## 2023-05-10 ENCOUNTER — HOSPITAL ENCOUNTER (OUTPATIENT)
Age: 68
Discharge: HOME | End: 2023-05-12
Payer: MEDICARE

## 2023-05-10 DIAGNOSIS — R13.10 PROBLEMS WITH SWALLOWING AND MASTICATION: ICD-10-CM

## 2023-05-10 PROCEDURE — 70360 X-RAY EXAM OF NECK: CPT

## 2023-05-17 ENCOUNTER — HOSPITAL ENCOUNTER (OUTPATIENT)
Dept: CT IMAGING | Age: 68
Discharge: HOME | End: 2023-05-19
Payer: MEDICARE

## 2023-05-17 DIAGNOSIS — R13.10 PROBLEMS WITH SWALLOWING AND MASTICATION: ICD-10-CM

## 2023-05-17 PROCEDURE — 70491 CT SOFT TISSUE NECK W/DYE: CPT

## 2023-05-17 PROCEDURE — 6360000004 HC RX CONTRAST MEDICATION: Performed by: RADIOLOGY

## 2023-05-17 PROCEDURE — 2580000003 HC RX 258: Performed by: RADIOLOGY

## 2023-05-17 RX ORDER — SODIUM CHLORIDE 0.9 % (FLUSH) 0.9 %
10 SYRINGE (ML) INJECTION PRN
Status: DISCONTINUED | OUTPATIENT
Start: 2023-05-17 | End: 2023-05-20 | Stop reason: HOSPADM

## 2023-05-17 RX ADMIN — SODIUM CHLORIDE, PRESERVATIVE FREE 10 ML: 5 INJECTION INTRAVENOUS at 13:59

## 2023-05-17 RX ADMIN — IOPAMIDOL 75 ML: 755 INJECTION, SOLUTION INTRAVENOUS at 13:58

## 2023-07-05 ENCOUNTER — OFFICE VISIT (OUTPATIENT)
Dept: CARDIOLOGY CLINIC | Age: 68
End: 2023-07-05
Payer: MEDICARE

## 2023-07-05 VITALS
WEIGHT: 198.5 LBS | DIASTOLIC BLOOD PRESSURE: 80 MMHG | RESPIRATION RATE: 18 BRPM | BODY MASS INDEX: 33.89 KG/M2 | SYSTOLIC BLOOD PRESSURE: 142 MMHG | HEART RATE: 77 BPM | HEIGHT: 64 IN

## 2023-07-05 DIAGNOSIS — E78.49 OTHER HYPERLIPIDEMIA: Primary | ICD-10-CM

## 2023-07-05 PROCEDURE — 1123F ACP DISCUSS/DSCN MKR DOCD: CPT | Performed by: INTERNAL MEDICINE

## 2023-07-05 PROCEDURE — 3017F COLORECTAL CA SCREEN DOC REV: CPT | Performed by: INTERNAL MEDICINE

## 2023-07-05 PROCEDURE — G8417 CALC BMI ABV UP PARAM F/U: HCPCS | Performed by: INTERNAL MEDICINE

## 2023-07-05 PROCEDURE — 1090F PRES/ABSN URINE INCON ASSESS: CPT | Performed by: INTERNAL MEDICINE

## 2023-07-05 PROCEDURE — G8427 DOCREV CUR MEDS BY ELIG CLIN: HCPCS | Performed by: INTERNAL MEDICINE

## 2023-07-05 PROCEDURE — G8400 PT W/DXA NO RESULTS DOC: HCPCS | Performed by: INTERNAL MEDICINE

## 2023-07-05 PROCEDURE — 93000 ELECTROCARDIOGRAM COMPLETE: CPT | Performed by: INTERNAL MEDICINE

## 2023-07-05 PROCEDURE — 1036F TOBACCO NON-USER: CPT | Performed by: INTERNAL MEDICINE

## 2023-07-05 PROCEDURE — 99214 OFFICE O/P EST MOD 30 MIN: CPT | Performed by: INTERNAL MEDICINE

## 2023-07-05 RX ORDER — METOPROLOL SUCCINATE 25 MG/1
25 TABLET, EXTENDED RELEASE ORAL DAILY
Qty: 90 TABLET | Refills: 3 | Status: SHIPPED | OUTPATIENT
Start: 2023-07-05

## 2023-07-05 RX ORDER — FAMOTIDINE 20 MG/1
20 TABLET, FILM COATED ORAL 2 TIMES DAILY
COMMUNITY
Start: 2023-06-01

## 2023-07-05 NOTE — PROGRESS NOTES
CHIEF COMPLAINT: Pacer    HISTORY OF PRESENT ILLNESS: Patient is a 77 y.o. female seen at the request of Saravanan Hollis MD.      Presents to establish follow up. No CP or SOB. No palpitations. Past Medical History:   Diagnosis Date    Contusion of back     Displacement of lumbar intervertebral disc without myelopathy     Fracture 2011    Pelvis    Hyperlipidemia     Hypertension     IBS (irritable bowel syndrome)     L4-L5 disc bulge     Sinus node dysfunction (HCC) 2020    Vitamin D deficiency        Patient Active Problem List   Diagnosis    Other hyperlipidemia       No Known Allergies    Current Outpatient Medications   Medication Sig Dispense Refill    famotidine (PEPCID) 20 MG tablet Take 1 tablet by mouth 2 times daily      metoprolol succinate (TOPROL XL) 25 MG extended release tablet Take 1 tablet by mouth daily 90 tablet 3    simvastatin (ZOCOR) 40 MG tablet Take 1 tablet by mouth nightly      amLODIPine (NORVASC) 2.5 MG tablet 1 tablet 2 times daily      Glucosamine 500 MG CAPS Take 500 mg by mouth daily      lisinopril (PRINIVIL;ZESTRIL) 20 MG tablet Take 1 tablet by mouth daily 30 tablet 3    Calcium Carbonate-Vitamin D (CALCIUM + D PO) Take 1 tablet by mouth daily. VITAMIN E PO Take 1 capsule by mouth daily. Dose unknown      traMADol (ULTRAM) 50 MG tablet Take 50 mg by mouth every 6 hours as needed. No current facility-administered medications for this visit.        Social History     Socioeconomic History    Marital status:      Spouse name: Not on file    Number of children: Not on file    Years of education: Not on file    Highest education level: Not on file   Occupational History    Not on file   Tobacco Use    Smoking status: Former     Packs/day: 0.50     Years: 10.00     Pack years: 5.00     Types: Cigarettes     Quit date: 10/5/1996     Years since quittin.7    Smokeless tobacco: Never   Vaping Use    Vaping Use: Never used   Substance and

## 2023-08-30 ENCOUNTER — HOSPITAL ENCOUNTER (OUTPATIENT)
Age: 68
Discharge: HOME | End: 2023-09-01

## 2023-08-31 PROCEDURE — 93294 REM INTERROG EVL PM/LDLS PM: CPT | Performed by: INTERNAL MEDICINE

## 2023-08-31 PROCEDURE — 93296 REM INTERROG EVL PM/IDS: CPT | Performed by: INTERNAL MEDICINE

## 2023-09-06 LAB — SURGICAL PATHOLOGY REPORT: NORMAL

## 2023-11-30 PROCEDURE — 93296 REM INTERROG EVL PM/IDS: CPT | Performed by: INTERNAL MEDICINE

## 2023-11-30 PROCEDURE — 93294 REM INTERROG EVL PM/LDLS PM: CPT | Performed by: INTERNAL MEDICINE
